# Patient Record
Sex: MALE | Race: WHITE | Employment: FULL TIME | URBAN - METROPOLITAN AREA
[De-identification: names, ages, dates, MRNs, and addresses within clinical notes are randomized per-mention and may not be internally consistent; named-entity substitution may affect disease eponyms.]

---

## 2019-09-01 ENCOUNTER — HOSPITAL ENCOUNTER (EMERGENCY)
Age: 24
Discharge: HOME | End: 2019-09-01
Attending: EMERGENCY MEDICINE
Payer: COMMERCIAL

## 2019-09-01 VITALS
TEMPERATURE: 98.2 F | WEIGHT: 182.98 LBS | SYSTOLIC BLOOD PRESSURE: 139 MMHG | BODY MASS INDEX: 26.2 KG/M2 | HEART RATE: 68 BPM | RESPIRATION RATE: 14 BRPM | HEIGHT: 70 IN | OXYGEN SATURATION: 100 % | DIASTOLIC BLOOD PRESSURE: 93 MMHG

## 2019-09-01 DIAGNOSIS — B02.9 HERPES ZOSTER WITHOUT COMPLICATION: Primary | ICD-10-CM

## 2019-09-01 PROCEDURE — 99282 EMERGENCY DEPT VISIT SF MDM: CPT

## 2019-09-01 RX ORDER — VALGANCICLOVIR 450 MG/1
900 TABLET, FILM COATED ORAL ONCE
Status: DISCONTINUED | OUTPATIENT
Start: 2019-09-01 | End: 2019-09-01 | Stop reason: HOSPADM

## 2019-09-01 RX ORDER — VALACYCLOVIR HYDROCHLORIDE 1 G/1
1000 TABLET, FILM COATED ORAL 3 TIMES DAILY
Qty: 21 TAB | Refills: 0 | Status: SHIPPED | OUTPATIENT
Start: 2019-09-01 | End: 2019-09-08

## 2019-09-01 NOTE — DISCHARGE INSTRUCTIONS
Patient Education        Shingles: Care Instructions  Your Care Instructions    Shingles (herpes zoster) causes pain and a blistered rash. The rash can appear anywhere on the body but will be on only one side of the body, the left or right. It will be in a band, a strip, or a small area. The pain can be very severe. Shingles can also cause tingling or itching in the area of the rash. The blisters scab over after a few days and heal in 2 to 4 weeks. Medicines can help you feel better and may help prevent more serious problems caused by shingles. Shingles is caused by the same virus that causes chickenpox. When you have chickenpox, the virus gets into your nerve roots and stays there (becomes dormant) long after you get over the chickenpox. If the virus becomes active again, it can cause shingles. Follow-up care is a key part of your treatment and safety. Be sure to make and go to all appointments, and call your doctor if you are having problems. It's also a good idea to know your test results and keep a list of the medicines you take. How can you care for yourself at home? · Be safe with medicines. Take your medicines exactly as prescribed. Call your doctor if you think you are having a problem with your medicine. Antiviral medicine helps you get better faster. · Try not to scratch or pick at the blisters. They will crust over and fall off on their own if you leave them alone. · Put cool, wet cloths on the area to relieve pain and itching. You can also use calamine lotion. Try not to use so much lotion that it cakes and is hard to get off. · Put cornstarch or baking soda on the sores to help dry them out so they heal faster. · Do not use thick ointment, such as petroleum jelly, on the sores. This will keep them from drying and healing. · To help remove loose crusts, soak them in tap water. This can help decrease oozing, and dry and soothe the skin.   · Take an over-the-counter pain medicine, such as acetaminophen (Tylenol), ibuprofen (Advil, Motrin), or naproxen (Aleve). Read and follow all instructions on the label. · Avoid close contact with people until the blisters have healed. It is very important for you to avoid contact with anyone who has never had chickenpox or the chickenpox vaccine. Pregnant women, young babies, and anyone else who has a hard time fighting infection (such as someone with HIV, diabetes, or cancer) is especially at risk. When should you call for help? Call your doctor now or seek immediate medical care if:    · You have a new or higher fever.     · You have a severe headache and a stiff neck.     · You lose the ability to think clearly.     · The rash spreads to your forehead, nose, eyes, or eyelids.     · You have eye pain, or your vision gets worse.     · You have new pain in your face, or you cannot move the muscles in your face.     · Blisters spread to new parts of your body.    Watch closely for changes in your health, and be sure to contact your doctor if:    · The rash has not healed after 2 to 4 weeks.     · You still have pain after the rash has healed. Where can you learn more? Go to http://andrea-hernandez.info/. Orville Lyons in the search box to learn more about \"Shingles: Care Instructions. \"  Current as of: July 30, 2018  Content Version: 12.1  © 3281-6326 [employer]. Care instructions adapted under license by [employer] (which disclaims liability or warranty for this information). If you have questions about a medical condition or this instruction, always ask your healthcare professional. [address] any warranty or liability for your use of this information.

## 2019-09-01 NOTE — ED PROVIDER NOTES
40-year-old male presents to the emergency department with rash. Patient reports he has had a intermittent rash to his face over the last 6 or 7 years. He reports that about once a year when he gets stressed or sick he gets a rash to his face. Usually gets on the left side of his forehead. He said he was sick last week with upper respiratory infection and cold. He reports that starting today he developed a small amount of rash on his left forehead. The rash feels like his previous rash that has been treated for on a yearly basis when this pops up. He usually gets treated with Valtrex for 1 week and it gets better. He was told in the past its reactivation of chickenpox virus. Patient denies any rash or pain in his eyes. He denies any rash on his nose or ear. The only place he has noticed it is on the left side of his forehead. No fevers. No shortness of breath or chest pain. No vomiting. No other medical problems. History reviewed. No pertinent past medical history. History reviewed. No pertinent surgical history. History reviewed. No pertinent family history.     Social History     Socioeconomic History    Marital status: SINGLE     Spouse name: Not on file    Number of children: Not on file    Years of education: Not on file    Highest education level: Not on file   Occupational History    Not on file   Social Needs    Financial resource strain: Not on file    Food insecurity:     Worry: Not on file     Inability: Not on file    Transportation needs:     Medical: Not on file     Non-medical: Not on file   Tobacco Use    Smoking status: Never Smoker    Smokeless tobacco: Never Used   Substance and Sexual Activity    Alcohol use: Not on file    Drug use: Not on file    Sexual activity: Not on file   Lifestyle    Physical activity:     Days per week: Not on file     Minutes per session: Not on file    Stress: Not on file   Relationships    Social connections:     Talks on phone: Not on file     Gets together: Not on file     Attends Mosque service: Not on file     Active member of club or organization: Not on file     Attends meetings of clubs or organizations: Not on file     Relationship status: Not on file    Intimate partner violence:     Fear of current or ex partner: Not on file     Emotionally abused: Not on file     Physically abused: Not on file     Forced sexual activity: Not on file   Other Topics Concern    Not on file   Social History Narrative    Not on file         ALLERGIES: Patient has no known allergies. Review of Systems   Constitutional: Negative for activity change and fever. HENT: Negative for congestion. Eyes: Negative for photophobia, pain and redness. Respiratory: Negative for cough and shortness of breath. Cardiovascular: Negative for chest pain and leg swelling. Gastrointestinal: Negative for abdominal pain. Endocrine: Negative for polyuria. Genitourinary: Negative for flank pain and hematuria. Musculoskeletal: Negative for gait problem and neck pain. Skin: Positive for rash. Allergic/Immunologic: Negative for immunocompromised state. Neurological: Negative for speech difficulty and headaches. Hematological: Does not bruise/bleed easily. Psychiatric/Behavioral: Negative for confusion. All other systems reviewed and are negative. Vitals:    09/01/19 0528   BP: (!) 139/93   Pulse: 68   Resp: 14   Temp: 98.2 °F (36.8 °C)   SpO2: 100%   Weight: 83 kg (182 lb 15.7 oz)   Height: 5' 10\" (1.778 m)            Physical Exam   Constitutional: He is oriented to person, place, and time. He appears well-developed and well-nourished. No distress. HENT:   Head: Normocephalic and atraumatic. Right Ear: External ear normal.   Left Ear: External ear normal.   2 small patches of crusty rash to his left forehead. No rash to his nose or ear. Eyes: Pupils are equal, round, and reactive to light.  EOM are normal.   Pupils are 4 mm and reactive bilaterally, normal extraocular movements, no redness of the eyes   Neck: Normal range of motion. Neck supple. No JVD present. No tracheal deviation present. Cardiovascular: Normal rate, regular rhythm and normal heart sounds. Exam reveals no gallop and no friction rub. No murmur heard. Pulmonary/Chest: Effort normal and breath sounds normal. No stridor. No respiratory distress. He has no wheezes. He has no rales. Abdominal: Soft. Bowel sounds are normal. He exhibits no distension. There is no tenderness. There is no rebound and no guarding. Musculoskeletal: Normal range of motion. He exhibits no edema or tenderness. Neurological: He is alert and oriented to person, place, and time. He has normal reflexes. No cranial nerve deficit or sensory deficit. He exhibits normal muscle tone. Coordination normal.   Skin: Skin is warm and dry. He is not diaphoretic. Psychiatric: He has a normal mood and affect. His behavior is normal. Judgment and thought content normal.   Nursing note and vitals reviewed. MDM  Number of Diagnoses or Management Options  Diagnosis management comments: Pt w/ what appears to be shingles in left forehead. No involvement of eye or nose on exam. Will treat w/ Valtrex and PCP f/u and return precautions as needed       Amount and/or Complexity of Data Reviewed  Decide to obtain previous medical records or to obtain history from someone other than the patient: yes  Review and summarize past medical records: yes  Independent visualization of images, tracings, or specimens: yes           Procedures    Good return precautions given to patient. Close follow up with PCP recommended. Patient and/or family voices understanding of this plan. Discharge instructions were explained by me and all concerns were addressed.

## 2020-11-11 ENCOUNTER — HOSPITAL ENCOUNTER (OUTPATIENT)
Dept: GENERAL RADIOLOGY | Age: 25
Discharge: HOME | End: 2020-11-11
Payer: COMMERCIAL

## 2020-11-11 ENCOUNTER — TRANSCRIBE ORDER (OUTPATIENT)
Dept: REGISTRATION | Age: 25
End: 2020-11-11

## 2020-11-11 DIAGNOSIS — M25.561 RIGHT KNEE PAIN: ICD-10-CM

## 2020-11-11 DIAGNOSIS — M25.561 RIGHT KNEE PAIN: Primary | ICD-10-CM

## 2020-11-11 PROCEDURE — 73560 X-RAY EXAM OF KNEE 1 OR 2: CPT

## 2020-11-11 PROCEDURE — 73565 X-RAY EXAM OF KNEES: CPT

## 2021-01-12 ENCOUNTER — APPOINTMENT (OUTPATIENT)
Dept: PHYSICAL THERAPY | Age: 26
End: 2021-01-12

## 2021-01-12 ENCOUNTER — HOSPITAL ENCOUNTER (OUTPATIENT)
Dept: PHYSICAL THERAPY | Age: 26
Discharge: HOME | End: 2021-01-12
Payer: COMMERCIAL

## 2021-01-12 PROCEDURE — 97016 VASOPNEUMATIC DEVICE THERAPY: CPT

## 2021-01-12 PROCEDURE — 97110 THERAPEUTIC EXERCISES: CPT

## 2021-01-12 PROCEDURE — 97161 PT EVAL LOW COMPLEX 20 MIN: CPT
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
## 2021-01-15 ENCOUNTER — HOSPITAL ENCOUNTER (OUTPATIENT)
Dept: PHYSICAL THERAPY | Age: 26
Discharge: HOME | End: 2021-01-15
Payer: COMMERCIAL

## 2021-01-15 PROCEDURE — 97110 THERAPEUTIC EXERCISES: CPT

## 2021-01-15 PROCEDURE — 97112 NEUROMUSCULAR REEDUCATION: CPT

## 2021-01-15 PROCEDURE — 97140 MANUAL THERAPY 1/> REGIONS: CPT

## 2021-01-15 PROCEDURE — 97016 VASOPNEUMATIC DEVICE THERAPY: CPT

## 2021-01-15 NOTE — PROGRESS NOTES
PT DAILY TREATMENT NOTE - [facility] 2-15    Patient Name: Stepan Macdonald  Date:1/15/2021  : 1995  [x]  Patient  Verified  Payor: Roseann Guard / Plan: Dl Mack / Product Type: HMO /    In time:10:31A  Out time:11:30A  Total Treatment Time (min): 59  Total Timed Codes (min): 44  1:1 Treatment Time ([address] only): 44  Visit #:  2    Treatment Area: Knee pain, right [M25.561]    SUBJECTIVE  Pain Level (0-10 scale): 2  Any medication changes, allergies to medications, adverse drug reactions, diagnosis change, or new procedure performed?: [x] No    [] Yes (see summary sheet for update)  Subjective functional status/changes:   [] No changes reported  Pt reports continued decreased sensation down the R lateral lower leg which has not improved. He spoke to MD about issue at appt, who recommended avoiding elevation of leg for better blood flow and to monitor symptoms. Pt reports compliance with HEP,  no difficulty with use of crutches for ambulation or stair negotiation. OBJECTIVE    Modality rationale: decrease edema, decrease inflammation and decrease pain to improve the patients ability to ambulate with decreased pain and more normalized mechanics. Min Type Additional Details     []? Estim: []? Att   []? Unatt        []? TENS instruct                  []?IFC  []? Premod   [x]? NMES                     []?Other:  []?w/US   []?w/ice   []?w/heat  Position:    Location:      []? Traction: []? Cervical       []? Lumbar                       []? Prone          []? Supine                       []?Intermittent   []? Continuous Lbs:  []? before manual  []? after manual  []?w/heat     []? Ultrasound: []? Continuous   []? Pulsed at:                           []? 1MHz   []? 3MHz Location:  W/cm2:     []? Paraffin         Location:   []?w/heat     []? Ice     []? Heat  []? Ice massage Position:  Location:     []? Laser  []? Other: Position:  Location:   15    [x]?   Vasopneumatic Device Pressure:       [x]? lo []? med []? hi Temperature: 34      [x]? Skin assessment post-treatment:  [x]? intact []? redness- no adverse reaction    []? redness  adverse reaction:      22 min Therapeutic Exercise:  [x]? See flow sheet : R knee flexion/extension PROM   Rationale: increase ROM, increase strength and increase proprioception to improve the patients ability to ambulate with decreased pain and improved mechanics. 10 min Neuromuscular Re-education:  []  See flow sheet : longsitting Russian NMES with quad set, Duty Cycle 10/50   Rationale: increase ROM and increase strength  to improve the patients ability to ambulate with decreased pain and improved mechanics. 12 min Manual Therapy: medial/lateral/superior/inferior patellar mobs, grade 1 posterior femoral mobs, cross friction massage on incision    Rationale: decrease pain, increase ROM, increase tissue extensibility and decrease edema  to improve the patients ability to ambulate with decreased discomfort and improve mechanics. With   [] TE   [] TA   [] Neuro   [] SC   [] other: Patient Education: [x] Review HEP    [] Progressed/Changed HEP based on:   [] positioning   [] body mechanics   [] transfers   [] heat/ice application    [] other:      Other Objective/Functional Measures:   Knee AROM: 1/72     Pain Level (0-10 scale) post treatment: 2    ASSESSMENT/Changes in Function:   Pt reports MD removed steri-strips, incisions intact. Pt knee ROM improving after manual interventions. Pt reporting decreased discomfort with knee PROM. Pt advancing with TE with 4 way SLR, demonstrating good form, though continued EXT lag with flexion. Pt progressed well through TE with limited pain. Pt tolerated added NMES with quad sets, achieving good co-contraction with e-stim.    Patient will continue to benefit from skilled PT services to modify and progress therapeutic interventions, address functional mobility deficits, address ROM deficits, address strength deficits, analyze and address soft tissue restrictions, analyze and cue movement patterns, analyze and modify body mechanics/ergonomics, assess and modify postural abnormalities, address imbalance/dizziness and instruct in home and community integration to attain remaining goals. []  See Plan of Care  []  See progress note/recertification  []  See Discharge Summary         Progress towards goals / Updated goals:  Short Term Goals: To be accomplished in 6 weeks:               1. Pt will be independent and compliant with HEP. 2) Pt will demonstrate Knee extension >/= to 0 degrees ext to aid in ambulation   3) Pt will be able to Ambulate with more normalized gait pattern on level terrain   4) Pt will demonstrate Knee flexion >/= to 120 to aid in sitting   Long Term Goals: To be accomplished in 12 week:               1. Pt will be independent and compliant with HEP. 2. Pt will improve FOTO score by the MCID demonstrating improved overall function with decreased pain or discomfort. 3. Pt will be able toNavigate a flight of stairs without pain   4) Pt will be able to Ambulate on uneven terrain without pain or instability   5) Pt will be able to Ambulate >/= 1 mile without pain   6) Pt will demonstrate Knee flexion >/= 120 degrees to aid in sitting/squatting  7) Pt will be able to return to running >/=1 mile without pain. 8) Pt will be able to squat to 90 degrees knee flexion with BW resistance without complaints of pain.     PLAN  [x]  Upgrade activities as tolerated     [x]  Continue plan of care  []  Update interventions per flow sheet       []  Discharge due to:_  []  Other:_      Jasson Souza, PT, DPT 1/15/2021

## 2021-01-19 ENCOUNTER — HOSPITAL ENCOUNTER (OUTPATIENT)
Dept: PHYSICAL THERAPY | Age: 26
Discharge: HOME | End: 2021-01-19
Payer: COMMERCIAL

## 2021-01-19 PROCEDURE — 97112 NEUROMUSCULAR REEDUCATION: CPT

## 2021-01-19 PROCEDURE — 97016 VASOPNEUMATIC DEVICE THERAPY: CPT

## 2021-01-19 PROCEDURE — 97140 MANUAL THERAPY 1/> REGIONS: CPT

## 2021-01-19 PROCEDURE — 97110 THERAPEUTIC EXERCISES: CPT

## 2021-01-19 NOTE — PROGRESS NOTES
PT DAILY TREATMENT NOTE - [facility] 2-15    Patient Name: Hiren Barbosa  Date:2021  : 1995  [x]  Patient  Verified  Payor: Tomasa Gregory / Plan: Do Fenton / Product Type: HMO /    In time:10:03a  Out time:11:22a  Total Treatment Time (min): 79 min   Total Timed Codes (min): 64 min  1:1 Treatment Time ( only): 58 min  Visit #:  3    Treatment Area: Knee pain, right [M25.561]    SUBJECTIVE  Pain Level (0-10 scale): 1  Any medication changes, allergies to medications, adverse drug reactions, diagnosis change, or new procedure performed?: [x]? No    []? Yes (see summary sheet for update)  Subjective functional status/changes:   []? No changes reported  Pt reports he spoke to MD about concerns with tingling and cold in his right foot and lateral lower leg. Pt and PT communicated with MD to monitor any changes in these symptoms, no overt concerns at this moment. Pt reports compliance with HEP and improved ROM and decreased edema and bruising.      OBJECTIVE            Modality rationale: decrease edema, decrease inflammation and decrease pain to improve the patients ability to ambulate with decreased pain and more normalized mechanics.     Min Type Additional Details     []? ? Estim: []??Att   []? ? Unatt        []? ?TENS instruct                  []? ?IFC  []? ?Premod   [x]? ? NMES                     []? ? Other:  []??w/US   []? ?w/ice   []? ?w/heat  Position:    Location:      []? ?  Traction: []?? Cervical       []? ?Lumbar                       []? ? Prone          []? ?Supine                       []? ?Intermittent   []? ? Continuous Lbs:  []?? before manual  []? ? after manual  []? ?w/heat     []? ?  Ultrasound: []??Continuous   []? ? Pulsed at:                           []? ?1MHz   []? ? 3MHz Location:  W/cm2:     []? ? Paraffin         Location:   []??w/heat     []? ?  Ice     []? ?  Heat  []? ?  Ice massage Position:  Location:     []? ?  Laser  []? ?  Other: Position:  Location:   15    [x]? ?  Vasopneumatic Device Pressure:       [x]? ? lo []? ? med []?? hi   Temperature: 34      [x]? ? Skin assessment post-treatment:  [x]? ? intact []? ?redness- no adverse reaction    []? ?redness  adverse reaction:      32 min Therapeutic Exercise:  [x]? ? See flow sheet : R knee flexion/extension PROM   Rationale: increase ROM, increase strength and increase proprioception to improve the patients ability to ambulate with decreased pain and improved mechanics. 16 min Neuromuscular Re-education:  []? See flow sheet : longsitting Russian NMES with quad set, Duty Cycle 10/50; HS isometrics and SLR with cuing for activation   Rationale: increase ROM and increase strength  to improve the patients ability to ambulate with decreased pain and improved mechanics.    16 min Manual Therapy: medial/lateral/superior/inferior patellar mobs, grade 1 posterior femoral mobs, cross friction massage on incision, STM to distal biceps femoris and proximal lateral gastroc    Rationale: decrease pain, increase ROM, increase tissue extensibility and decrease edema  to improve the patients ability to ambulate with decreased discomfort and improve mechanics. With   []? TE   []? TA   []? Neuro   []? SC   []? other: Patient Education: [x]? Review HEP    []? Progressed/Changed HEP based on:   []? positioning   []? body mechanics   []? transfers   []? heat/ice application    []? other:       Other Objective/Functional Measures:            Pain Level (0-10 scale) post treatment: 1     ASSESSMENT/Changes in Function:   Pt noted decreased light touch sensation along R anterior lower leg and coldness throughout right foot (distal>proximal); Pt also presenting with continued tingling in plantar aspect of foot and big toe; discussed findings and Pt complaints with nurse at [address] [place] [address] office who will relay to MD. Pt stated increased sensation/blood flow with WB/added heel raises.  Pt R knee ROM and strength continues to improve after manual interventions and therapeutic exercise. Pt achieving increased quad activation during quad sets and SLR. Pt performed SLR without brace with minimal extension lag. Added HS isometrics and heel raises (with brace) with no increased discomfort. Patient will continue to benefit from skilled PT services to modify and progress therapeutic interventions, address functional mobility deficits, address ROM deficits, address strength deficits, analyze and address soft tissue restrictions, analyze and cue movement patterns, analyze and modify body mechanics/ergonomics, assess and modify postural abnormalities, address imbalance/dizziness and instruct in home and community integration to attain remaining goals. []? See Plan of Care  []? See progress note/recertification  []? See Discharge Summary         Progress towards goals / Updated goals:  Short Term Goals: To be accomplished in 6 weeks:               1. Pt will be independent and compliant with HEP. 2) Pt will demonstrate Knee extension >/= to 0 degrees ext to aid in ambulation   3) Pt will be able to Ambulate with more normalized gait pattern on level terrain   4) Pt will demonstrate Knee flexion >/= to 120 to aid in sitting   Long Term Goals: To be accomplished in 12 week:               1. Pt will be independent and compliant with HEP.              2. Pt will improve FOTO score by the MCID demonstrating improved overall function with decreased pain or discomfort.                3. Pt will be able toNavigate a flight of stairs without pain   4) Pt will be able to Ambulate on uneven terrain without pain or instability   5) Pt will be able to Ambulate >/= 1 mile without pain   6) Pt will demonstrate Knee flexion >/= 120 degrees to aid in sitting/squatting  7) Pt will be able to return to running >/=1 mile without pain. 8) Pt will be able to squat to 90 degrees knee flexion with BW resistance without complaints of pain.     PLAN  [x]?   Upgrade activities as tolerated     [x]? Continue plan of care  []? Update interventions per flow sheet       []? Discharge due to:_  []?   Other:_        Klarissa Diamond, PT, DPT 1/19/2021

## 2021-01-20 ENCOUNTER — APPOINTMENT (OUTPATIENT)
Dept: PHYSICAL THERAPY | Age: 26
End: 2021-01-20
Payer: COMMERCIAL

## 2021-01-22 ENCOUNTER — HOSPITAL ENCOUNTER (OUTPATIENT)
Dept: PHYSICAL THERAPY | Age: 26
Discharge: HOME | End: 2021-01-22
Payer: COMMERCIAL

## 2021-01-22 PROCEDURE — 97140 MANUAL THERAPY 1/> REGIONS: CPT

## 2021-01-22 PROCEDURE — 97016 VASOPNEUMATIC DEVICE THERAPY: CPT

## 2021-01-22 PROCEDURE — 97112 NEUROMUSCULAR REEDUCATION: CPT

## 2021-01-22 PROCEDURE — 97110 THERAPEUTIC EXERCISES: CPT

## 2021-01-22 NOTE — PROGRESS NOTES
PT DAILY TREATMENT NOTE - [facility] 2-15    Patient Name: Suzie Parish  Date:2021  : 1995  [x]  Patient  Verified  Payor: Mary Clay / Plan: Tete Newsome / Product Type: HMO /    In time: 9:53a  Out time:11:10a  Total Treatment Time (min): 77  Total Timed Codes (min): 62  1:1 Treatment Time ( only): 62  Visit #:  4    Treatment Area: Knee pain, right [M25.561]    SUBJECTIVE  Pain Level (0-10 scale): 2  Any medication changes, allergies to medications, adverse drug reactions, diagnosis change, or new procedure performed?: [x]? ? No    []? ? Yes (see summary sheet for update)  Subjective functional status/changes:   []? ? No changes reported  Pt reports he had an LANCE and a doppler test done with negative findings. MD prescribed medication to assist with numbness/tingling, which patient has not taken yet. Pt reports continued numbness/tingling down into RLE but is sleeping a lot better at night. Pt reports R knee was sore after last session, but has since resolved.      OBJECTIVE                 Modality rationale: decrease edema, decrease inflammation and decrease pain to improve the patients ability to ambulate with decreased pain and more normalized mechanics.     Min Type Additional Details     []??? Estim: []? ? ? Att   []? ??Unatt        []? ??TENS instruct                  []? ??IFC  []? ? ?Premod   [x]? ??NMES                     []? ??Other:  []???w/US   []? ??w/ice   []? ??w/heat  Position:    Location:      []? ??  Traction: []??? Cervical       []? ? ?Lumbar                       []? ?? Prone          []? ? ?Supine                       []? ? ? Intermittent   []? ?? Continuous Lbs:  []??? before manual  []? ?? after manual  []? ??w/heat     []? ??  Ultrasound: []? ? ? Continuous   []? ?? Pulsed at:                           []? ??1MHz   []? ??3MHz Location:  W/cm2:     []??? Paraffin         Location:   []???w/heat     []? ??  Ice     []? ??  Heat  []? ??  Ice massage Position:  Location:     []? ??  Laser  []? ??  Other: Position:  Location:   15    [x]? ??  Vasopneumatic Device Pressure:       [x]? ?? lo []? ?? med []? ?? hi   Temperature: 34      [x]? ?? Skin assessment post-treatment:  [x]? ??intact []? ??redness- no adverse reaction    []? ??redness - adverse reaction:      28 min Therapeutic Exercise:  [x]? ?? See flow sheet : R knee flexion/extension PROM   Rationale: increase ROM, increase strength and increase proprioception to improve the patients ability to ambulate with decreased pain and improved mechanics.    18 min Neuromuscular Re-education:  [x]? ?  See flow sheet : supine Russian NMES with SLR, Duty Cycle 10/30; M/L A/P weight shifts     Rationale: increase ROM and increase strength  to improve the patients ability to ambulate with decreased pain and improved mechanics.    16 min Manual Therapy: medial/lateral/superior/inferior patellar mobs, grade II-III posterior femoral mobs, cross friction massage on incision, STM to proximal lateral gastroc    Rationale: decrease pain, increase ROM, increase tissue extensibility and decrease edema  to improve the patients ability to ambulate with decreased discomfort and improve mechanics.                                                                With   []?? TE   []?? TA   []? ? Neuro   []?? SC   []?? other: Patient Education: [x]? ? Review HEP    []? ? Progressed/Changed HEP based on:   []?? positioning   []? ? body mechanics   []? ? transfers   []? ? heat/ice application    []? ? other:       Other Objective/Functional Measures:         Knee ROM: 4/0/110     Pain Level (0-10 scale) post treatment: 1     ASSESSMENT/Changes in Function:   R knee ROM continues to improve, benefiting from manual interventions and ther-ex. Progressed quad activation with NMES with SLR, pt demonstrating strong contraction throughout range. Added weight bearing M/L and A/P weight shifts with UE support. Pt achieving good quad contraction.  Pt more challenged with A/P weight shifts with mild p!, cued to keep L foot on ground for increased support which relieved pain. Patient will continue to benefit from skilled PT services to modify and progress therapeutic interventions, address functional mobility deficits, address ROM deficits, address strength deficits, analyze and address soft tissue restrictions, analyze and cue movement patterns, analyze and modify body mechanics/ergonomics, assess and modify postural abnormalities, address imbalance/dizziness and instruct in home and community integration to attain remaining goals.   Treatment was performed with direct supervision by Pasco Lennox, PT, DPT.     []??  See Plan of Care  []? ?  See progress note/recertification  []? ?  See Discharge Summary         Progress towards goals / Updated goals:  Short Term Goals: To be accomplished in 6 weeks:               1. Pt will be independent and compliant with HEP. 2) Pt will demonstrate Knee extension >/= to 0 degrees ext to aid in ambulation   3) Pt will be able to Ambulate with more normalized gait pattern on level terrain   4) Pt will demonstrate Knee flexion >/= to 120 to aid in sitting   Long Term Goals: To be accomplished in 12 week:               1. Pt will be independent and compliant with HEP.              2. Pt will improve FOTO score by the MCID demonstrating improved overall function with decreased pain or discomfort.                3. Pt will be able toNavigate a flight of stairs without pain   4) Pt will be able to Ambulate on uneven terrain without pain or instability   5) Pt will be able to Ambulate >/= 1 mile without pain   6) Pt will demonstrate Knee flexion >/= 120 degrees to aid in sitting/squatting  7) Pt will be able to return to running >/=1 mile without pain. 8) Pt will be able to squat to 90 degrees knee flexion with BW resistance without complaints of pain.     PLAN   [x]? ?  Upgrade activities as tolerated     [x]? ?  Continue plan of care  []? ?  Update interventions per flow sheet       []? ?  Discharge due to:_  []??  Other:_           Deonte Kim, SPT 1/22/2021

## 2021-01-25 ENCOUNTER — HOSPITAL ENCOUNTER (OUTPATIENT)
Dept: PHYSICAL THERAPY | Age: 26
End: 2021-01-25
Payer: COMMERCIAL

## 2021-01-27 ENCOUNTER — HOSPITAL ENCOUNTER (OUTPATIENT)
Dept: PHYSICAL THERAPY | Age: 26
Discharge: HOME | End: 2021-01-27
Payer: COMMERCIAL

## 2021-01-27 PROCEDURE — 97110 THERAPEUTIC EXERCISES: CPT

## 2021-01-27 PROCEDURE — 97016 VASOPNEUMATIC DEVICE THERAPY: CPT

## 2021-01-27 PROCEDURE — 97112 NEUROMUSCULAR REEDUCATION: CPT

## 2021-01-27 PROCEDURE — 97140 MANUAL THERAPY 1/> REGIONS: CPT

## 2021-01-27 NOTE — PROGRESS NOTES
PT DAILY TREATMENT NOTE - [facility] 2-15    Patient Name: Marga Gross  Date:2021  : 1995  [x]  Patient  Verified  Payor: Jonn Diaz / Plan: Ye Cea / Product Type: HMO /    In time:9:57A  Out time:11:07A  Total Treatment Time (min): 70  Total Timed Codes (min): 55  1:1 Treatment Time ([address] only): 48   Visit #:  5    Treatment Area: Knee pain, right [M25.561]    SUBJECTIVE  Pain Level (0-10 scale): 0/10  Any medication changes, allergies to medications, adverse drug reactions, diagnosis change, or new procedure performed?: [x]? ?? No    []??? Yes (see summary sheet for update)  Subjective functional status/changes:   []? ?? No changes reported  Pt reported doing well this morning.      OBJECTIVE                 Modality rationale: decrease edema, decrease inflammation and decrease pain to improve the patients ability to ambulate with decreased pain and more normalized mechanics.     Min Type Additional Details     []???? Estim: []?? ?? Att   []????Unatt        []????TENS instruct                  []????IFC  []????Premod   [x]? ? ??NMES                     []?? ??Other:  []????w/US   []? ???w/ice   []? ???w/heat  Position:    Location:      []????  Traction: []???? Cervical       []????Lumbar                       []???? Prone          []????Supine                       []?? ?? Intermittent   []???? Continuous Lbs:  []???? before manual  []???? after manual  []? ???w/heat     []????  Ultrasound: []???? Continuous   []???? Pulsed at:                           []????1MHz   []????3MHz Location:  W/cm2:     []???? Paraffin         Location:   []????w/heat     []????  Ice     []????  Heat  []????  Ice massage Position:  Location:     []????  Laser  []????  Other: Position:  Location:   15    [x]? ???  Vasopneumatic Device Pressure:       [x]? ??? lo []???? med []???? hi   Temperature: 34      [x]? ??? Skin assessment post-treatment:  [x]? ???intact []? ???redness- no adverse reaction    []? ???redness  adverse reaction:    30 min Therapeutic Exercise:  [x]? ??? See flow sheet : R knee flexion/extension PROM   Rationale: increase ROM, increase strength and increase proprioception to improve the patients ability to ambulate with decreased pain and improved mechanics.    10 min Neuromuscular Re-education:  [x]? ??  See flow sheet : supine Russian NMES with SLR, Duty Cycle 10/30; M/L A/P weight shifts     Rationale: increase ROM and increase strength  to improve the patients ability to ambulate with decreased pain and improved mechanics.    15 min Manual Therapy: medial/lateral/superior/inferior patellar mobs, grade II-III posterior femoral mobs, cross friction massage on incision, STM to proximal lateral gastroc    Rationale: decrease pain, increase ROM, increase tissue extensibility and decrease edema  to improve the patients ability to ambulate with decreased discomfort and improve mechanics.                                                                With   []??? TE   []??? TA   []??? Neuro   []??? SC   []? ?? other: Patient Education: [x]??? Review HEP    []? ?? Progressed/Changed HEP based on:   []??? positioning   []? ?? body mechanics   []? ?? transfers   []? ?? heat/ice application    []? ?? other:       Other Objective/Functional Measures:         Knee ROM: flexion:118             Extension: 0     Pain Level (0-10 scale) post treatment: 0     ASSESSMENT/Changes in Function:   Pt continues to be challenged with A/P weight shifting. Able to tolerate standing hip abduction and extension. Fatigues quickly.    Patient will continue to benefit from skilled PT services to modify and progress therapeutic interventions, address functional mobility deficits, address ROM deficits, address strength deficits, analyze and address soft tissue restrictions, analyze and cue movement patterns, analyze and modify body mechanics/ergonomics, assess and modify postural abnormalities, address imbalance/dizziness and instruct in home and community integration to attain remaining goals.     []? ??  See Plan of Care  []? ??  See progress note/recertification  []? ??  See Discharge Summary         Progress towards goals / Updated goals:  Short Term Goals: To be accomplished in 6 weeks:               1. Pt will be independent and compliant with HEP. 2) Pt will demonstrate Knee extension >/= to 0 degrees ext to aid in ambulation   3) Pt will be able to Ambulate with more normalized gait pattern on level terrain   4) Pt will demonstrate Knee flexion >/= to 120 to aid in sitting   Long Term Goals: To be accomplished in 12 week:               1. Pt will be independent and compliant with HEP.              2. Pt will improve FOTO score by the MCID demonstrating improved overall function with decreased pain or discomfort.                3. Pt will be able toNavigate a flight of stairs without pain   4) Pt will be able to Ambulate on uneven terrain without pain or instability   5) Pt will be able to Ambulate >/= 1 mile without pain   6) Pt will demonstrate Knee flexion >/= 120 degrees to aid in sitting/squatting  7) Pt will be able to return to running >/=1 mile without pain. 8) Pt will be able to squat to 90 degrees knee flexion with BW resistance without complaints of pain.     PLAN   [x]???  Upgrade activities as tolerated     [x]? ??  Continue plan of care  []? ??  Update interventions per flow sheet       []???  Discharge due to:_  []???  Other:_           Brian Coker, JACEK, OPTA 1/27/2021

## 2021-01-29 ENCOUNTER — HOSPITAL ENCOUNTER (OUTPATIENT)
Dept: PHYSICAL THERAPY | Age: 26
Discharge: HOME | End: 2021-01-29
Payer: COMMERCIAL

## 2021-01-29 PROCEDURE — 97110 THERAPEUTIC EXERCISES: CPT

## 2021-01-29 PROCEDURE — 97016 VASOPNEUMATIC DEVICE THERAPY: CPT

## 2021-01-29 PROCEDURE — 97140 MANUAL THERAPY 1/> REGIONS: CPT

## 2021-01-29 NOTE — PROGRESS NOTES
PT DAILY TREATMENT NOTE - [facility] 2-15    Patient Name: Rico Medrano  Date:2021  : 1995  [x]  Patient  Verified  Payor: Cherri Fees / Plan: Balbir Brunson / Product Type: HMO /    In time:10:15am  Out time:11:28  Total Treatment Time (min): 73  Total Timed Codes (min): 58  1:1 Treatment Time ( only): 52  Visit #:  6    Treatment Area: Knee pain, right [M25.561]    SUBJECTIVE  Pain Level (0-10 scale): 0/10  Any medication changes, allergies to medications, adverse drug reactions, diagnosis change, or new procedure performed?: [x]???? No    []???? Yes (see summary sheet for update)  Subjective functional status/changes:   []???? No changes reported  Pt reports knee has been doing well since last session. Pt reports having done his core exercise routine which irritated his knee, but has since resolved.      OBJECTIVE                 Modality rationale: decrease edema, decrease inflammation and decrease pain to improve the patients ability to ambulate with decreased pain and more normalized mechanics.     Min Type Additional Details     []????? Estim: []??? ? ? Att   []??? ?? Unatt        []?????TENS instruct                  []?????IFC  []??? ? ? Premod   [x]??? ? ?NMES                     []??? ?? Other:  []?????w/US   []?????w/ice   []?????w/heat  Position:    Location:      []?????  Traction: []????? Cervical       []??? ? ?Lumbar                       []????? Prone          []??? ? ? Supine                       []??? ? ? Intermittent   []??? ? ? Continuous Lbs:  []????? before manual  []????? after manual  []?????w/heat     []?????  Ultrasound: []??? ? ? Continuous   []????? Pulsed at:                           []?????1MHz   []?????3MHz Location:  W/cm2:     []????? Paraffin         Location:   []?????w/heat     []?????  Ice     []?????  Heat  []?????  Ice massage Position:  Location:     []?????  Laser  []?????  Other: Position:  Location:   15    [x]?????  Vasopneumatic Device Pressure:       [x]????? lo [x]????? med []????? hi   Temperature: 34      [x]????? Skin assessment post-treatment:  [x]? ????intact []?????redness- no adverse reaction    []?????redness - adverse reaction:      42 min Therapeutic Exercise:  [x]? ???? See flow sheet : R knee flexion/extension PROM   Rationale: increase ROM, increase strength and increase proprioception to improve the patients ability to ambulate with decreased pain and improved mechanics.    6 min Neuromuscular Re-education:  [x]? ???  See flow sheet : M/L A/P weight shifts     Rationale: increase ROM and increase strength  to improve the patients ability to ambulate with decreased pain and improved mechanics.    10 min Manual Therapy: 4-way patellar mobs, grade III posterior femoral mobs, cross friction massage on incision    Rationale: decrease pain, increase ROM, increase tissue extensibility and decrease edema  to improve the patients ability to ambulate with decreased discomfort and improve mechanics.                                                                With   []???? TE   []???? TA   []???? Neuro   []???? SC   []???? other: Patient Education: [x]???? Review HEP    []???? Progressed/Changed HEP based on:   []???? positioning   []???? body mechanics   []???? transfers   [x]???? heat/ice application    []???? other:       Other Objective/Functional Measures:         Knee ROM: flexion:124                        Extension: +4     Pain Level (0-10 scale) post treatment: 0     ASSESSMENT/Changes in Function:   Pt tolerated advancements in therapeutic exercises today. Progressed WB exercises by adding mini squats and forward and lateral step ups with 4in step. Pt with increased fatigue with step ups, but demonstrated good eccentric quad control. Discussed with patients about ambulating without crutches, but to take them on his trip to [place] next week.    Patient will continue to benefit from skilled PT services to modify and progress therapeutic interventions, address functional mobility deficits, address ROM deficits, address strength deficits, analyze and address soft tissue restrictions, analyze and cue movement patterns, analyze and modify body mechanics/ergonomics, assess and modify postural abnormalities, address imbalance/dizziness and instruct in home and community integration to attain remaining goals.   Treatment was performed with direct supervision by Alessia Mojica, PT, DPT.     []????  See Plan of Care  []????  See progress note/recertification  []????  See Discharge Summary         Progress towards goals / Updated goals:  Short Term Goals: To be accomplished in 6 weeks:               1. Pt will be independent and compliant with HEP. 2) Pt will demonstrate Knee extension >/= to 0 degrees ext to aid in ambulation   3) Pt will be able to Ambulate with more normalized gait pattern on level terrain   4) Pt will demonstrate Knee flexion >/= to 120 to aid in sitting   Long Term Goals: To be accomplished in 12 week:               1. Pt will be independent and compliant with HEP.              2. Pt will improve FOTO score by the MCID demonstrating improved overall function with decreased pain or discomfort.                3. Pt will be able toNavigate a flight of stairs without pain   4) Pt will be able to Ambulate on uneven terrain without pain or instability   5) Pt will be able to Ambulate >/= 1 mile without pain   6) Pt will demonstrate Knee flexion >/= 120 degrees to aid in sitting/squatting  7) Pt will be able to return to running >/=1 mile without pain. 8) Pt will be able to squat to 90 degrees knee flexion with BW resistance without complaints of pain.     PLAN   [x]? ???  Upgrade activities as tolerated     [x]? ???  Continue plan of care  []????  Update interventions per flow sheet       []????  Discharge due to:_  []????  Other:_        Triny Camp 1/29/2021

## 2021-02-01 ENCOUNTER — HOSPITAL ENCOUNTER (OUTPATIENT)
Dept: PHYSICAL THERAPY | Age: 26
Discharge: HOME | End: 2021-02-01
Payer: COMMERCIAL

## 2021-02-01 PROCEDURE — 97016 VASOPNEUMATIC DEVICE THERAPY: CPT

## 2021-02-01 PROCEDURE — 97140 MANUAL THERAPY 1/> REGIONS: CPT

## 2021-02-01 PROCEDURE — 97110 THERAPEUTIC EXERCISES: CPT

## 2021-02-01 NOTE — PROGRESS NOTES
PT DAILY TREATMENT NOTE - [facility] 2-15    Patient Name: Chandu Vieira  Date:2021  : 1995  [x]  Patient  Verified  Payor: Philly Mora / Plan: Jayshree Araiza / Product Type: HMO /    In time:9:13A  Out time:10:29A  Total Treatment Time (min): 76  Total Timed Codes (min): 61  1:1 Treatment Time ( only): 48   Visit #:  7    Treatment Area: Knee pain, right [M25.561]    SUBJECTIVE  Pain Level (0-10 scale): 0/10  Any medication changes, allergies to medications, adverse drug reactions, diagnosis change, or new procedure performed?: [x]????? No    []????? Yes (see summary sheet for update)  Subjective functional status/changes:   []????? No changes reported  Pt reported doing well this morning. OBJECTIVE                 Modality rationale: decrease edema, decrease inflammation and decrease pain to improve the patients ability to ambulate with decreased pain and more normalized mechanics.     Min Type Additional Details     []?????? Estim: []???? ? ? Att   []???? ?? Unatt        []??????TENS instruct                  []??????IFC  []?????? Premod   [x]??????NMES                     []?????? Other:  []??????w/US   []??????w/ice   []??????w/heat  Position:    Location:      []??????  Traction: []?????? Cervical       []??????Lumbar                       []?????? Prone          []?????? Supine                       []???? ? ? Intermittent   []?????? Continuous Lbs:  []?????? before manual  []?????? after manual  []??????w/heat     []??????  Ultrasound: []?????? Continuous   []?????? Pulsed at:                           []??????1MHz   []??????3MHz Location:  W/cm2:     []?????? Paraffin         Location:   []??????w/heat     []??????  Ice     []??????  Heat  []??????  Ice massage Position:  Location:     []??????  Laser  []??????  Other: Position:  Location:   15    [x]??????  Vasopneumatic Device Pressure:       [x]?????? lo [x]?????? med []?????? hi   Temperature: 34      [x]?????? Skin assessment post-treatment:  [x]??????intact []??????redness- no adverse reaction    []??????redness  adverse reaction:      46 min Therapeutic Exercise:  [x]?????? See flow sheet : R knee flexion/extension PROM   Rationale: increase ROM, increase strength and increase proprioception to improve the patients ability to ambulate with decreased pain and improved mechanics.    15 min Manual Therapy: 4-way patellar mobs, grade III posterior femoral mobs, cross friction massage on incision    Rationale: decrease pain, increase ROM, increase tissue extensibility and decrease edema  to improve the patients ability to ambulate with decreased discomfort and improve mechanics.                                                                With   []????? TE   []????? TA   []????? Neuro   []????? SC   []????? other: Patient Education: [x]????? Review HEP    []????? Progressed/Changed HEP based on:   []????? positioning   []????? body mechanics   []????? transfers   [x]????? heat/ice application    []????? other:       Other Objective/Functional Measures:         Knee ROM: Flexion:126                       Extension: +5     Pain Level (0-10 scale) post treatment: 0/10     ASSESSMENT/Changes in Function:   Pt tolerated session well. Fatigues easily with CKC exercises. Pt inquired about holding PVC pipe on shoulder for squats. Advised to hold off at this time.    Patient will continue to benefit from skilled PT services to modify and progress therapeutic interventions, address functional mobility deficits, address ROM deficits, address strength deficits, analyze and address soft tissue restrictions, analyze and cue movement patterns, analyze and modify body mechanics/ergonomics, assess and modify postural abnormalities, address imbalance/dizziness and instruct in home and community integration to attain remaining goals.     []?????  See Plan of Care  []?????  See progress note/recertification  []?????  See Discharge Summary         Progress towards goals / Updated goals:  Short Term Goals: To be accomplished in 6 weeks:               1. Pt will be independent and compliant with HEP. 2) Pt will demonstrate Knee extension >/= to 0 degrees ext to aid in ambulation   3) Pt will be able to Ambulate with more normalized gait pattern on level terrain   4) Pt will demonstrate Knee flexion >/= to 120 to aid in sitting   Long Term Goals: To be accomplished in 12 week:               1. Pt will be independent and compliant with HEP.              2. Pt will improve FOTO score by the MCID demonstrating improved overall function with decreased pain or discomfort.                3. Pt will be able toNavigate a flight of stairs without pain   4) Pt will be able to Ambulate on uneven terrain without pain or instability   5) Pt will be able to Ambulate >/= 1 mile without pain   6) Pt will demonstrate Knee flexion >/= 120 degrees to aid in sitting/squatting  7) Pt will be able to return to running >/=1 mile without pain.   8) Pt will be able to squat to 90 degrees knee flexion with BW resistance without complaints of pain.     PLAN   [x]?????  Upgrade activities as tolerated     [x]?????  Continue plan of care  []?????  Update interventions per flow sheet       []?????  Discharge due to:_  []?????  Other:_        Ignacio Barker PTA, [facility] 2/1/2021

## 2021-02-03 ENCOUNTER — HOSPITAL ENCOUNTER (OUTPATIENT)
Dept: PHYSICAL THERAPY | Age: 26
Discharge: HOME | End: 2021-02-03
Payer: COMMERCIAL

## 2021-02-03 PROCEDURE — 97112 NEUROMUSCULAR REEDUCATION: CPT

## 2021-02-03 PROCEDURE — 97110 THERAPEUTIC EXERCISES: CPT

## 2021-02-03 PROCEDURE — 97016 VASOPNEUMATIC DEVICE THERAPY: CPT

## 2021-02-03 NOTE — PROGRESS NOTES
PT DAILY TREATMENT NOTE - [facility] 2-15    Patient Name: Rico Medrano  Date:2/3/2021  : 1995  [x]  Patient  Verified  Payor: Cherri Fees / Plan: Balbir Brunson / Product Type: HMO /    In time:10:57am  Out time:12:15pm  Total Treatment Time (min): 78  Total Timed Codes (min): 63  1:1 Treatment Time ( only): 62  Visit #:  8    Treatment Area: Knee pain, right [M25.561]    SUBJECTIVE  Pain Level (0-10 scale): 0/10  Any medication changes, allergies to medications, adverse drug reactions, diagnosis change, or new procedure performed?: [x]?????? No    []?????? Yes (see summary sheet for update)  Subjective functional status/changes:   []?????? No changes reported  Pt reported knee feels good, reports compliance with HEP.     OBJECTIVE                 Modality rationale: decrease edema, decrease inflammation and decrease pain to improve the patients ability to ambulate with decreased pain and more normalized mechanics.     Min Type Additional Details     []??????? Estim: []????? ?? Att   []??????? Unatt        []???????TENS instruct                  []???????IFC  []??????? Premod   [x]???????NMES                     []??????? Other:  []???????w/US   []???????w/ice   []???????w/heat  Position:    Location:      []???????  Traction: []??????? Cervical       []???????Lumbar                       []??????? Prone          []??????? Supine                       []????? ? ? Intermittent   []??????? Continuous Lbs:  []??????? before manual  []??????? after manual  []???????w/heat     []???????  Ultrasound: []??????? Continuous   []??????? Pulsed at:                           []???????1MHz   []???????3MHz Location:  W/cm2:     []??????? Paraffin         Location:   []???????w/heat     []???????  Ice     []???????  Heat  []???????  Ice massage Position:  Location:     []???????  Laser  []???????  Other: Position:  Location:   15    [x]???????  Vasopneumatic Device Pressure:       [x]??????? lo [x]??????? med []??????? hi Temperature: 34      [x]??????? Skin assessment post-treatment:  [x]???????intact []???????redness- no adverse reaction    []???????redness  adverse reaction:      53 min Therapeutic Exercise:  [x]??????? See flow sheet : R knee flexion/extension PROM   Rationale: increase ROM, increase strength and increase proprioception to improve the patients ability to ambulate with decreased pain and improved mechanics. 10 min Neuromuscular Re-education:  []  See flow sheet : Rockerboard taps, tandem stance on foam   Rationale: improve balance and increase proprioception  to improve the patients ability to ambulate with decreased discomfort and improved mechanics.                                                                With   []?????? TE   []?????? TA   []?????? Neuro   []?????? SC   []?????? other: Patient Education: [x]?????? Review HEP    []?????? Progressed/Changed HEP based on:   []?????? positioning   []?????? body mechanics   []?????? transfers   [x]?????? heat/ice application    []?????? other:       Other Objective/Functional Measures:           Knee ROM: Flexion: 3-0-134      Pain Level (0-10 scale) post treatment: 0/10     ASSESSMENT/Changes in Function:   Pt progressing through therapeutic exercises with no complaints of pain, just increased fatigue with CKC exercises. Challenged patient with eccentric calf raises with weight shifting to the R. Increased step up height; fatiguing quickly but no pain. Added proprioceptive exercises, which patient tolerated well.    Patient will continue to benefit from skilled PT services to modify and progress therapeutic interventions, address functional mobility deficits, address ROM deficits, address strength deficits, analyze and address soft tissue restrictions, analyze and cue movement patterns, analyze and modify body mechanics/ergonomics, assess and modify postural abnormalities, address imbalance/dizziness and instruct in home and community integration to attain remaining goals. Treatment was performed with direct supervision by Kim Rai, PT, DPT.      [x]??????  See Plan of Care  []??????  See progress note/recertification  []??????  See Discharge Summary         Progress towards goals / Updated goals:  Short Term Goals: To be accomplished in 6 weeks:               1. Pt will be independent and compliant with HEP. -MET  2) Pt will demonstrate Knee extension >/= to 0 degrees ext to aid in ambulation -MET  3) Pt will be able to Ambulate with more normalized gait pattern on level terrain - Progressing  4) Pt will demonstrate Knee flexion >/= to 120 to aid in sitting  -MET  Long Term Goals: To be accomplished in 12 week:               1. Pt will be independent and compliant with HEP. -MET               2. Pt will improve FOTO score by the MCID demonstrating improved overall function with decreased pain or discomfort.               3. Pt will be able to Navigate a flight of stairs without pain - Progressing  4) Pt will be able to Ambulate on uneven terrain without pain or instability - Progressing  5) Pt will be able to Ambulate >/= 1 mile without pain -Progressing  6) Pt will demonstrate Knee flexion >/= 120 degrees to aid in sitting/squatting -MET  7) Pt will be able to return to running >/=1 mile without pain. - Progressing  8) Pt will be able to squat to 90 degrees knee flexion with BW resistance without complaints of pain.  - Progressing     PLAN   [x]??????  Upgrade activities as tolerated     [x]??????  Continue plan of care  []??????  Update interventions per flow sheet       []??????  Discharge due to:_  []??????  Other:_      Flavio Rascon, [facility] 2/3/2021

## 2021-02-03 NOTE — PROGRESS NOTES
Physical Therapy at [facility],   a part of [facility]  [address] [address], [address] [place] [address], [address] [place] [address]  Phone: [phone] Fax: [phone]    Progress Note    Name: Jorge Cho   : 1995   MD: Mendez Lutz MD       Treatment Diagnosis: Knee pain, right [M25.561]  Start of Care: 21    Visits from Start of Care: 9  Missed Visits: 1    Summary of Care: Mr. Charlie Gibson has been seen for 9 skilled physical therapy sessions s/p right ACL surgery. Therapy sessions have consisted of manual interventions, e-stim of right quadriceps, neuromuscular re-education, and therapeutic exercises. Maryanne Marshall has made great progress in terms of his range of motion and quadriceps activation. We have progressed to more closed chain strength exercises and proprioceptive training at this point. At this point he has been weaned from his crutches and continues to use his brace unlocked for ambulation. Pain is minimal and tingling/cold sensations in lower leg have significantly improved. Maryanne Marshall would benefit from continued skilled physical therapy sessions to continue to improve strength, functional mobility and normalize his gait mechanics. Thank you for this referral!     Assessment / Recommendations: Maryanne Marshall would benefit from continued skilled physical therapy sessions to continue to improve strength, functional mobility and normalize his gait mechanics. Objective:  Knee ROM: 4-0-133  Straight leg raise x10 reps - no extension lag    Goal:  Progress towards goals / Updated goals:  Short Term Goals: To be accomplished in 6 weeks:               1.  Pt will be independent and compliant with HEP. -MET  2) Pt will demonstrate Knee extension >/= to 0 degrees ext to aid in ambulation -MET  3) Pt will be able to Ambulate with more normalized gait pattern on level terrain - Progressing  4) Pt will demonstrate Knee flexion >/= to 120 to aid in sitting  -MET  Long Term Goals: To be accomplished in 12 week:               1. Pt will be independent and compliant with HEP. -MET               2. Pt will improve FOTO score by the MCID demonstrating improved overall function with decreased pain or discomfort.               3. Pt will be able to Navigate a flight of stairs without pain - Progressing  4) Pt will be able to Ambulate on uneven terrain without pain or instability - Progressing  5) Pt will be able to Ambulate >/= 1 mile without pain -Progressing  6) Pt will demonstrate Knee flexion >/= 120 degrees to aid in sitting/squatting -MET  7) Pt will be able to return to running >/=1 mile without pain. - Progressing  8) Pt will be able to squat to 90 degrees knee flexion with BW resistance without complaints of pain. - Progressing    Treatment was performed with direct supervision by River Martins, PT, DPT. Tobin Yin, SPT 2/3/2021     ________________________________________________________________________  NOTE TO PHYSICIAN:  Please complete the following and fax to: [facility]: Fax: [phone]. Isak King Retain this original for your records. If you are unable to process this request in 24 hours, please contact our office.        ____ I have read the above report and request that my patient continue therapy with the following changes/special instructions:  ____ I have read the above report and request that my patient be discharged from therapy    Physician's Signature:_________________ Date:___________Time:__________

## 2021-02-10 ENCOUNTER — HOSPITAL ENCOUNTER (OUTPATIENT)
Dept: PHYSICAL THERAPY | Age: 26
Discharge: HOME | End: 2021-02-10
Payer: COMMERCIAL

## 2021-02-10 PROCEDURE — 97110 THERAPEUTIC EXERCISES: CPT

## 2021-02-10 PROCEDURE — 97112 NEUROMUSCULAR REEDUCATION: CPT

## 2021-02-10 NOTE — PROGRESS NOTES
PT DAILY TREATMENT NOTE - [facility] 2-15    Patient Name: Jayshree Pitt  Date:2/10/2021  : 1995  [x]  Patient  Verified  Payor: Elvia Overall / Plan: Jefferson Setter / Product Type: HMO /    In time:10:00A  Out time:11:20A  Total Treatment Time (min): 80  Total Timed Codes (min): 65  1:1 Treatment Time ([facility] only): 48   Visit #:  9    Treatment Area: Knee pain, right [M25.561]    SUBJECTIVE  Pain Level (0-10 scale): 0/10  Any medication changes, allergies to medications, adverse drug reactions, diagnosis change, or new procedure performed?: [x]??????? No    []??????? Yes (see summary sheet for update)  Subjective functional status/changes:   []??????? No changes reported  Pt reports doing well. Just got back from [place]. Knee tolerated traveling well.     OBJECTIVE                 Modality rationale: decrease edema, decrease inflammation and decrease pain to improve the patients ability to ambulate with decreased pain and more normalized mechanics.     Min Type Additional Details     []???????? Estim: []?????? ?? Att   []???????? Unatt        []????????TENS instruct                  []????????IFC  []???????? Premod   [x]????????NMES                     []???????? Other:  []????????w/US   []????????w/ice   []????????w/heat  Position:    Location:      []????????  Traction: []???????? Cervical       []????????Lumbar                       []???????? Prone          []???????? Supine                       []?????? ? ? Intermittent   []???????? Continuous Lbs:  []???????? before manual  []???????? after manual  []????????w/heat     []????????  Ultrasound: []???????? Continuous   []???????? Pulsed at:                           []????????1MHz   []????????3MHz Location:  W/cm2:     []???????? Paraffin         Location:   []????????w/heat    15  [x]????????  Ice     []????????  Heat  []????????  Ice massage Position:seated   Location:R knee     []????????  Laser  []????????  Other: Position:  Location:       []????????  Vasopneumatic Device Pressure:       []???????? lo []???????? med []???????? hi   Temperature:       [x]???????? Skin assessment post-treatment:  [x]????????intact []????????redness- no adverse reaction    []????????redness  adverse reaction:      50 min Therapeutic Exercise:  [x]???????? See flow sheet : R knee flexion/extension PROM   Rationale: increase ROM, increase strength and increase proprioception to improve the patients ability to ambulate with decreased pain and improved mechanics.    15 min Neuromuscular Re-education:  [x]? See flow sheet : Rockerboard taps, tandem stance on foam   Rationale: improve balance and increase proprioception  to improve the patients ability to ambulate with decreased discomfort and improved mechanics.                                                                With   []??????? TE   []??????? TA   []??????? Neuro   []??????? SC   []??????? other: Patient Education: [x]??????? Review HEP    []??????? Progressed/Changed HEP based on:   []??????? positioning   []??????? body mechanics   []??????? transfers   [x]??????? heat/ice application    []??????? other:       Other Objective/Functional Measures:           Knee ROM: Flexion: 133  Knee ROM: Extension: +4     Pain Level (0-10 scale) post treatment: 0/10     ASSESSMENT/Changes in Function:   Pt able to complete 10 reps of SLR flexion without any extension lag. Fatigue does present but he is able to maintain control.    Patient will continue to benefit from skilled PT services to modify and progress therapeutic interventions, address functional mobility deficits, address ROM deficits, address strength deficits, analyze and address soft tissue restrictions, analyze and cue movement patterns, analyze and modify body mechanics/ergonomics, assess and modify postural abnormalities, address imbalance/dizziness and instruct in home and community integration to attain remaining goals.     [x]???????  See Plan of Care  []???????  See progress note/recertification  []???????  See Discharge Summary         Progress towards goals / Updated goals:  Short Term Goals: To be accomplished in 6 weeks:               1. Pt will be independent and compliant with HEP. -MET  2) Pt will demonstrate Knee extension >/= to 0 degrees ext to aid in ambulation -MET  3) Pt will be able to Ambulate with more normalized gait pattern on level terrain - Progressing  4) Pt will demonstrate Knee flexion >/= to 120 to aid in sitting  -MET  Long Term Goals: To be accomplished in 12 week:               1. Pt will be independent and compliant with HEP. -MET               2. Pt will improve FOTO score by the MCID demonstrating improved overall function with decreased pain or discomfort.               3. Pt will be able to Navigate a flight of stairs without pain - Progressing  4) Pt will be able to Ambulate on uneven terrain without pain or instability - Progressing  5) Pt will be able to Ambulate >/= 1 mile without pain -Progressing  6) Pt will demonstrate Knee flexion >/= 120 degrees to aid in sitting/squatting -MET  7) Pt will be able to return to running >/=1 mile without pain. - Progressing  8) Pt will be able to squat to 90 degrees knee flexion with BW resistance without complaints of pain.  - Progressing     PLAN   [x]???????  Upgrade activities as tolerated     [x]???????  Continue plan of care  []???????  Update interventions per flow sheet       []???????  Discharge due to:_  []???????  Other:_        Ladi Franks PTA, [facility] 2/10/2021

## 2021-02-12 ENCOUNTER — APPOINTMENT (OUTPATIENT)
Dept: PHYSICAL THERAPY | Age: 26
End: 2021-02-12
Payer: COMMERCIAL

## 2021-02-18 ENCOUNTER — APPOINTMENT (OUTPATIENT)
Dept: PHYSICAL THERAPY | Age: 26
End: 2021-02-18
Payer: COMMERCIAL

## 2021-02-23 ENCOUNTER — HOSPITAL ENCOUNTER (OUTPATIENT)
Dept: PHYSICAL THERAPY | Age: 26
Discharge: HOME | End: 2021-02-23
Payer: COMMERCIAL

## 2021-02-23 PROCEDURE — 97110 THERAPEUTIC EXERCISES: CPT

## 2021-02-23 PROCEDURE — 97112 NEUROMUSCULAR REEDUCATION: CPT

## 2021-02-23 NOTE — PROGRESS NOTES
PT DAILY TREATMENT NOTE - [facility] 2-15    Patient Name: Chandu Vieira  Date:2021  : 1995  [x]  Patient  Verified  Payor: Philly Mora / Plan: Jayshree Araiza / Product Type: HMO /    In time:9:35A  Out time: 10:55A  Total Treatment Time (min): 80  Total Timed Codes (min): 80  1:1 Treatment Time ([address] only): 74   Visit #:  10    Treatment Area: Knee pain, right [M25.561]    SUBJECTIVE  Pain Level (0-10 scale): 0/10  Any medication changes, allergies to medications, adverse drug reactions, diagnosis change, or new procedure performed?: [x]??????? No    []??????? Yes (see summary sheet for update)  Subjective functional status/changes:   []??????? No changes reported  Pt reports doing well. FU with MD went well. He is out of the brace completely now.      OBJECTIVE     60 min Therapeutic Exercise:  [x]???????? See flow sheet : R knee flexion/extension PROM   Rationale: increase ROM, increase strength and increase proprioception to improve the patients ability to ambulate with decreased pain and improved mechanics.     20 min Neuromuscular Re-education:  [x]? See flow sheet : Rockerboard taps, tandem stance on foam   Rationale: improve balance and increase proprioception  to improve the patients ability to ambulate with decreased discomfort and improved mechanics.                                                                With   []??????? TE   []??????? TA   []??????? Neuro   []??????? SC   []??????? other: Patient Education: [x]??????? Review HEP    []??????? Progressed/Changed HEP based on:   []??????? positioning   []??????? body mechanics   []??????? transfers   [x]??????? heat/ice application    []??????? other:       Other Objective/Functional Measures:           Knee ROM: Flexion: 138  Knee ROM: Extension: +5     Pain Level (0-10 scale) post treatment: 0/10     ASSESSMENT/Changes in Function:   Pt tolerated session well. Advanced hip engagement with t band on bridges today.  Added 1.5# weight for SLR.   Patient will continue to benefit from skilled PT services to modify and progress therapeutic interventions, address functional mobility deficits, address ROM deficits, address strength deficits, analyze and address soft tissue restrictions, analyze and cue movement patterns, analyze and modify body mechanics/ergonomics, assess and modify postural abnormalities, address imbalance/dizziness and instruct in home and community integration to attain remaining goals.     [x]???????  See Plan of Care  []???????  See progress note/recertification  []???????  See Discharge Summary         Progress towards goals / Updated goals:  Short Term Goals: To be accomplished in 6 weeks:               1. Pt will be independent and compliant with HEP. -MET  2) Pt will demonstrate Knee extension >/= to 0 degrees ext to aid in ambulation -MET  3) Pt will be able to Ambulate with more normalized gait pattern on level terrain - Progressing  4) Pt will demonstrate Knee flexion >/= to 120 to aid in sitting  -MET  Long Term Goals: To be accomplished in 12 week:               1. Pt will be independent and compliant with HEP. -MET               2. Pt will improve FOTO score by the MCID demonstrating improved overall function with decreased pain or discomfort.               3. Pt will be able to Navigate a flight of stairs without pain - Progressing  4) Pt will be able to Ambulate on uneven terrain without pain or instability - Progressing  5) Pt will be able to Ambulate >/= 1 mile without pain -Progressing  6) Pt will demonstrate Knee flexion >/= 120 degrees to aid in sitting/squatting -MET  7) Pt will be able to return to running >/=1 mile without pain. - Progressing  8) Pt will be able to squat to 90 degrees knee flexion with BW resistance without complaints of pain. - Progressing     PLAN   [x]???????  Upgrade activities as tolerated     [x]???????  Continue plan of care  []???????  Update interventions per flow sheet        []???????  Discharge due to:_  []???????  Other:_        Jennifer Fitting, PTA, [facility] 2/23/2021

## 2021-02-26 ENCOUNTER — HOSPITAL ENCOUNTER (OUTPATIENT)
Dept: PHYSICAL THERAPY | Age: 26
Discharge: HOME | End: 2021-02-26
Payer: COMMERCIAL

## 2021-02-26 PROCEDURE — 97016 VASOPNEUMATIC DEVICE THERAPY: CPT

## 2021-02-26 PROCEDURE — 97110 THERAPEUTIC EXERCISES: CPT

## 2021-02-26 PROCEDURE — 97112 NEUROMUSCULAR REEDUCATION: CPT

## 2021-02-26 NOTE — PROGRESS NOTES
PT DAILY TREATMENT NOTE - [facility] 2-15    Patient Name: Rico Medrano  Date:2021  : 1995  [x]  Patient  Verified  Payor: Cherri Fees / Plan: Balbir Brunson / Product Type: HMO /    In time:10:15  Out time:11:30  Total Treatment Time (min): 75  Total Timed Codes (min): 60  1:1 Treatment Time (MC only): 41  Visit #:  11    Treatment Area: Knee pain, right [M25.561]    SUBJECTIVE  Pain Level (0-10 scale): 0/10  Any medication changes, allergies to medications, adverse drug reactions, diagnosis change, or new procedure performed?: [x]???????? No    []???????? Yes (see summary sheet for update)  Subjective functional status/changes:   []???????? No changes reported  Pt reports he has been doing well, he was a little sore after last session, but otherwise feels good. OBJECTIVE                Modality rationale: decrease edema, decrease inflammation and decrease pain to improve the patients ability to ambulate with decreased pain and more normalized mechanics.     Min Type Additional Details     []???????? Estim: []?????? ?? Att   []???????? Unatt        []????????TENS instruct                  []????????IFC  []???????? Premod   [x]????????NMES                     []???????? Other:  []????????w/US   []????????w/ice   []????????w/heat  Position:    Location:      []????????  Traction: []???????? Cervical       []????????Lumbar                       []???????? Prone          []???????? Supine                       []?????? ? ? Intermittent   []???????? Continuous Lbs:  []???????? before manual  []???????? after manual  []????????w/heat     []????????  Ultrasound: []???????? Continuous   []???????? Pulsed at:                           []????????1MHz   []????????3MHz Location:  W/cm2:     []???????? Paraffin         Location:   []????????w/heat     []????????  Ice     []????????  Heat  []????????  Ice massage Position:  Location:     []????????  Laser  []????????  Other: Position:  Location:   15    [x]????????  Vasopneumatic Device Pressure:       []???????? lo [x]???????? med []???????? hi   Temperature: 34      [x]???????? Skin assessment post-treatment:  [x]????????intact []????????redness- no adverse reaction    []????????redness  adverse reaction:       45 min Therapeutic Exercise:  [x]????????? See flow sheet : R knee flexion/extension PROM   Rationale: increase ROM, increase strength and increase proprioception to improve the patients ability to ambulate with decreased pain and improved mechanics.    15 min Neuromuscular Re-education:  [x]? ?  See flow sheet : Rockerboard taps and hold, SLS on foam   Rationale: improve balance and increase proprioception  to improve the patients ability to ambulate with decreased discomfort and improved mechanics.                                                                   With   []???????? TE   []???????? TA   []???????? Neuro   []???????? SC   []???????? other: Patient Education: [x]???????? Review HEP    []???????? Progressed/Changed HEP based on:   []???????? positioning   []???????? body mechanics   []???????? transfers   [x]???????? heat/ice application    []???????? other:       Other Objective/Functional Measures:     80 degrees with 2x30\" wall squats          Knee ROM: Flexion: 138  Knee ROM: Extension: +5     Pain Level (0-10 scale) post treatment: 0/10     ASSESSMENT/Changes in Function:   Pt feeling fatigued with advances in therapeutic exercises, but tolerated with no increased pain. Pt able to tolerate wall squats at 80 degrees knee flexion with fatigue beginning around 15-20 seconds. Pt able to advance to SL calf raises.     Patient will continue to benefit from skilled PT services to modify and progress therapeutic interventions, address functional mobility deficits, address ROM deficits, address strength deficits, analyze and address soft tissue restrictions, analyze and cue movement patterns, analyze and modify body mechanics/ergonomics, assess and modify postural abnormalities, address imbalance/dizziness and instruct in home and community integration to attain remaining goals. Treatment was performed with direct supervision by Gavi Chinchilla, PT, DPT.      [x]????????  See Plan of Care  []????????  See progress note/recertification  []????????  See Discharge Summary         Progress towards goals / Updated goals:  Short Term Goals: To be accomplished in 6 weeks:               1. Pt will be independent and compliant with HEP. -MET  2) Pt will demonstrate Knee extension >/= to 0 degrees ext to aid in ambulation -MET  3) Pt will be able to Ambulate with more normalized gait pattern on level terrain - Progressing  4) Pt will demonstrate Knee flexion >/= to 120 to aid in sitting  -MET  Long Term Goals: To be accomplished in 12 week:               1.  Pt will be independent and compliant with HEP. -MET               2. Pt will improve FOTO score by the MCID demonstrating improved overall function with decreased pain or discomfort.               3. Pt will be able to Navigate a flight of stairs without pain - Progressing  4) Pt will be able to Ambulate on uneven terrain without pain or instability -MET  5) Pt will be able to Ambulate >/= 1 mile without pain -Progressing  6) Pt will demonstrate Knee flexion >/= 120 degrees to aid in sitting/squatting -MET  7) Pt will be able to return to running >/=1 mile without pain. - Progressing  8) Pt will be able to squat to 90 degrees knee flexion with BW resistance without complaints of pain. - Progressing     PLAN   [x]????????  Upgrade activities as tolerated     [x]????????  Continue plan of care  []????????  Update interventions per flow sheet       []????????  Discharge due to:_  []????????  Other:_      Mana Swift, [facility] 2/26/2021

## 2021-03-01 ENCOUNTER — HOSPITAL ENCOUNTER (OUTPATIENT)
Dept: PHYSICAL THERAPY | Age: 26
Discharge: HOME | End: 2021-03-01
Payer: COMMERCIAL

## 2021-03-01 PROCEDURE — 97112 NEUROMUSCULAR REEDUCATION: CPT

## 2021-03-01 PROCEDURE — 97016 VASOPNEUMATIC DEVICE THERAPY: CPT

## 2021-03-01 PROCEDURE — 97110 THERAPEUTIC EXERCISES: CPT

## 2021-03-01 NOTE — PROGRESS NOTES
PT DAILY TREATMENT NOTE - [facility] 2-15    Patient Name: Adrianne Saavedra  Date:3/1/2021  : 1995  [x]  Patient  Verified  Payor: Sussy Bunn / Plan: Jeremy Cervantes / Product Type: HMO /    In time:7:02am  Out time: 8:33am  Total Treatment Time (min): 91  Total Timed Codes (min): 76  1:1 Treatment Time ( only): 69  Visit #:  12    Treatment Area: Knee pain, right [M25.561]    SUBJECTIVE  Pain Level (0-10 scale): 0/10  Any medication changes, allergies to medications, adverse drug reactions, diagnosis change, or new procedure performed?: [x]????????? No    []????????? Yes (see summary sheet for update)  Subjective functional status/changes:   []????????? No changes reported  Pt reports he walked on an incline on the treadmill over the weekend for >1 mile and he had no pain.      OBJECTIVE                 Modality rationale: decrease edema, decrease inflammation and decrease pain to improve the patients ability to ambulate with decreased pain and more normalized mechanics.     Min Type Additional Details     []????????? Estim: []??????? ?? Att   []????????? Unatt        []?????????TENS instruct                  []?????????IFC  []????????? Premod   [x]?????????NMES                     []????????? Other:  []?????????w/US   []?????????w/ice   []?????????w/heat  Position:    Location:      []?????????  Traction: []????????? Cervical       []?????????Lumbar                       []????????? Prone          []????????? Supine                       []??????? ? ? Intermittent   []????????? Continuous Lbs:  []????????? before manual  []????????? after manual  []?????????w/heat     []?????????  Ultrasound: []????????? Continuous   []????????? Pulsed at:                           []?????????1MHz   []?????????3MHz Location:  W/cm2:     []????????? Paraffin         Location:   []?????????w/heat     []?????????  Ice     []?????????  Heat  []?????????  Ice massage Position:  Location:     []?????????  Laser  []?????????  Other: Position:  Location:   15    [x]?????????  Vasopneumatic Device Pressure:       []????????? lo [x]????????? med []????????? hi   Temperature: 34      [x]????????? Skin assessment post-treatment:  [x]?????????intact []?????????redness- no adverse reaction    []?????????redness  adverse reaction:       58 min Therapeutic Exercise:  [x]?????????? See flow sheet:    Rationale: increase ROM, increase strength and increase proprioception to improve the patients ability to ambulate with decreased pain and improved mechanics.    18 min Neuromuscular Re-education:  [x]? ??  See flow sheet : Rockerboard taps and hold, SLS on foam, BOSU step-up   Rationale: improve balance and increase proprioception  to improve the patients ability to ambulate with decreased discomfort and improved mechanics.                                                                   With   []????????? TE   []????????? TA   []????????? Neuro   []????????? SC   []????????? other: Patient Education: [x]????????? Review HEP    []????????? Progressed/Changed HEP based on:   []????????? positioning   []????????? body mechanics   []????????? transfers   [x]????????? heat/ice application    []????????? other:       Other Objective/Functional Measures:            Knee ROM: Flexion: 140  Knee ROM: Extension: +5     Pain Level (0-10 scale) post treatment: 0/10     ASSESSMENT/Changes in Function:   Pt challenged with Swiss Ball hamstring curls today, but no reports of pain. Pt muscular endurance improved since last session, only fatiguing with last set. Incorporating contralateral strengthening as well.    Patient will continue to benefit from skilled PT services to modify and progress therapeutic interventions, address functional mobility deficits, address ROM deficits, address strength deficits, analyze and address soft tissue restrictions, analyze and cue movement patterns, analyze and modify body mechanics/ergonomics, assess and modify postural abnormalities, address imbalance/dizziness and instruct in home and community integration to attain remaining goals.   Treatment was performed with direct supervision by eRyes Bolton, PT, DPT.     []?????????  See Plan of Care  [x]?????????  See progress note/recertification  []?????????  See Discharge Summary         Progress towards goals / Updated goals:  Short Term Goals: To be accomplished in 6 weeks:               1. Pt will be independent and compliant with HEP. -MET  2) Pt will demonstrate Knee extension >/= to 0 degrees ext to aid in ambulation -MET  3) Pt will be able to Ambulate with more normalized gait pattern on level terrain - MET  4) Pt will demonstrate Knee flexion >/= to 120 to aid in sitting  -MET  Long Term Goals: To be accomplished in 12 week:               1.  Pt will be independent and compliant with HEP. -MET               2. Pt will improve FOTO score by the MCID demonstrating improved overall function with decreased pain or discomfort.               3. Pt will be able to Navigate a flight of stairs without pain - MET  4) Pt will be able to Ambulate on uneven terrain without pain or instability -MET  5) Pt will be able to Ambulate >/= 1 mile without pain -MET  6) Pt will demonstrate Knee flexion >/= 120 degrees to aid in sitting/squatting -MET  7) Pt will be able to return to running >/=1 mile without pain. - Progressing  8) Pt will be able to squat to 90 degrees knee flexion with BW resistance without complaints of pain. - Progressing (pain during initial concentric portion.)     PLAN   [x]?????????  Upgrade activities as tolerated     [x]?????????  Continue plan of care  []?????????  Update interventions per flow sheet       []?????????  Discharge due to:_  []?????????  Other:_        Candice Rivera, SPT 3/1/2021

## 2021-03-01 NOTE — PROGRESS NOTES
Physical Therapy at [facility],   a part of [address], [address] [place] [address]  Phone: [phone] Fax: [phone]    Progress Note    Name: Alex Curtis   : 1995   MD: Elizabeth Shah MD       Treatment Diagnosis: Knee pain, right [M25.561]  Start of Care: 21    Visits from Start of Care: 12   Missed Visits: 1    Summary of Care:Mr. Anne Garcia has been seen for 12 skilled physical therapy sessions s/p right ACL surgery. Therapy sessions have consisted of manual interventions, neuromuscular re-education, therapeutic exercises, therapeutic activities and modalities. We have continued to progress with closed chain lower extremity strengthening and balance while also addressing his contralateral side. Pt is demonstrating improvements in his strength, functional mobility and muscular endurance. He is not having any pain at this point and is able to walk over a mile with no complaints of pain. Alicja Johnson would benefit from continued skilled physical therapy sessions to continue to improve strength, functional mobility and return to dynamic activity/running. Thank you for this referral!      Assessment / Recommendations: Alicja Johnson would benefit from continued skilled physical therapy sessions to continue to improve strength, functional mobility and return to dynamic activity/running. Knee ROM: Flexion: 140  Knee ROM: Extension: +5    Progress towards goals / Updated goals:  Short Term Goals: To be accomplished in 6 weeks:               1. Pt will be independent and compliant with HEP. -MET  2) Pt will demonstrate Knee extension >/= to 0 degrees ext to aid in ambulation -MET  3) Pt will be able to Ambulate with more normalized gait pattern on level terrain - MET  4) Pt will demonstrate Knee flexion >/= to 120 to aid in sitting  -MET  Long Term Goals: To be accomplished in 12 week:               1.  Pt will be independent and compliant with HEP.  -MET               2. Pt will improve FOTO score by the MCID demonstrating improved overall function with decreased pain or discomfort.               3. Pt will be able to Navigate a flight of stairs without pain - MET  4) Pt will be able to Ambulate on uneven terrain without pain or instability -MET  5) Pt will be able to Ambulate >/= 1 mile without pain -MET  6) Pt will demonstrate Knee flexion >/= 120 degrees to aid in sitting/squatting -MET  7) Pt will be able to return to running >/=1 mile without pain. - Progressing  8) Pt will be able to squat to 90 degrees knee flexion with BW resistance without complaints of pain. - Progressing (pain during initial concentric portion.)    Treatment was performed with direct supervision by Kathy Christopher, PT, DPT.      Crissy Porras, SPT 3/1/2021

## 2021-03-10 ENCOUNTER — HOSPITAL ENCOUNTER (OUTPATIENT)
Dept: PHYSICAL THERAPY | Age: 26
Discharge: HOME | End: 2021-03-10
Payer: COMMERCIAL

## 2021-03-10 PROCEDURE — 97110 THERAPEUTIC EXERCISES: CPT

## 2021-03-10 PROCEDURE — 97112 NEUROMUSCULAR REEDUCATION: CPT

## 2021-03-10 PROCEDURE — 97016 VASOPNEUMATIC DEVICE THERAPY: CPT

## 2021-03-10 NOTE — PROGRESS NOTES
PT DAILY TREATMENT NOTE - [facility] 2-15    Patient Name: Jackie Powell  Date:3/10/2021  : 1995  [x]  Patient  Verified  Payor: Cat Matute / Plan: Shonda Stammer / Product Type: HMO /    In time:7:03am  Out time: 8:07am  Total Treatment Time (min): 64  Total Timed Codes (min): 49  1:1 Treatment Time ( [address] only): 37  Visit #:  13    Treatment Area: Knee pain, right [M25.561]    SUBJECTIVE  Pain Level (0-10 scale): 0/10   Any medication changes, allergies to medications, adverse drug reactions, diagnosis change, or new procedure performed?: [x]?????????? No    []?????????? Yes (see summary sheet for update)  Subjective functional status/changes:   []?????????? No changes reported  Pt reports his knee has been doing well. Saw MD on 3/9, said everything looked good, he was happy with his progress. MD suggested wearing a brace for lateral and dynamic activity; will discuss more at next follow-up visit with MD in 6 weeks. OBJECTIVE                 Modality rationale: decrease edema, decrease inflammation and decrease pain to improve the patients ability to ambulate with decreased pain and more normalized mechanics.     Min Type Additional Details     []?????????? Estim: []???????? ?? Att   []?????????? Unatt        []??????????TENS instruct                  []??????????IFC  []?????????? Premod   [x]??????????NMES                     []?????????? Other:  []??????????w/US   []??????????w/ice   []??????????w/heat  Position:    Location:      []??????????  Traction: []?????????? Cervical       []??????????Lumbar                       []?????????? Prone          []?????????? Supine                       []???????? ? ? Intermittent   []?????????? Continuous Lbs:  []?????????? before manual  []?????????? after manual  []??????????w/heat     []??????????  Ultrasound: []?????????? Continuous   []?????????? Pulsed at:                           []??????????1MHz   []??????????3MHz Location:  W/cm2:     []?????????? Paraffin         Location:   []??????????w/heat     []??????????  Ice     []??????????  Heat  []??????????  Ice massage Position:  Location:     []??????????  Laser  []??????????  Other: Position:  Location:   15    [x]??????????  Vasopneumatic Device Pressure:       []?????????? lo [x]?????????? med []?????????? hi   Temperature: 34      [x]?????????? Skin assessment post-treatment:  [x]??????????intact []??????????redness- no adverse reaction    []??????????redness  adverse reaction:       37 min Therapeutic Exercise:  [x]??????????? See flow sheet:    Rationale: increase ROM, increase strength and increase proprioception to improve the patients ability to ambulate with decreased pain and improved mechanics.    12 min Neuromuscular Re-education:  [x]? ???  See flow sheet : Rockerboard taps and hold, SLS on foam, tandem foam rebounder    Rationale: improve balance and increase proprioception  to improve the patients ability to ambulate with decreased discomfort and improved mechanics.                                                                   With   []?????????? TE   []?????????? TA   []?????????? Neuro   []?????????? SC   []?????????? other: Patient Education: [x]?????????? Review HEP     []?????????? Progressed/Changed HEP based on:   []?????????? positioning   []?????????? body mechanics   []?????????? transfers   [x]?????????? heat/ice application    []?????????? other:       Other Objective/Functional Measures: --            Pain Level (0-10 scale) post treatment: 0/10     ASSESSMENT/Changes in Function:   Pt challenged with SWB  triple threat for HS strengthening today, but no reports of pain. Pt able to perform wall sits with fatigue beginning around 45 seconds. Pt is challenged/fatigued with step ups. Will continue to progress dynamic hip and lower extremity strengthening in all directions.  Patient will continue to benefit from skilled PT services to modify and progress therapeutic interventions, address functional mobility deficits, address ROM deficits, address strength deficits, analyze and address soft tissue restrictions, analyze and cue movement patterns, analyze and modify body mechanics/ergonomics, assess and modify postural abnormalities, address imbalance/dizziness and instruct in home and community integration to attain remaining goals.   Treatment was performed with direct supervision by Macy Galvan, PT, DPT.     []??????????  See Plan of Care  [x]??????????  See progress note/recertification  []??????????  See Discharge Summary         Progress towards goals / Updated goals:  Short Term Goals: To be accomplished in 6 weeks:               1. Pt will be independent and compliant with HEP. -MET  2) Pt will demonstrate Knee extension >/= to 0 degrees ext to aid in ambulation -MET  3) Pt will be able to Ambulate with more normalized gait pattern on level terrain - MET  4) Pt will demonstrate Knee flexion >/= to 120 to aid in sitting  -MET  Long Term Goals: To be accomplished in 12 week:               1.  Pt will be independent and compliant with HEP. -MET               2. Pt will improve FOTO score by the MCID demonstrating improved overall function with decreased pain or discomfort.               3. Pt will be able to Navigate a flight of stairs without pain - MET  4) Pt will be able to Ambulate on uneven terrain without pain or instability -MET  5) Pt will be able to Ambulate >/= 1 mile without pain -MET  6) Pt will demonstrate Knee flexion >/= 120 degrees to aid in sitting/squatting -MET  7) Pt will be able to return to running >/=1 mile without pain. - Progressing  8) Pt will be able to squat to 90 degrees knee flexion with BW resistance without complaints of pain. - Progressing (pain during initial concentric portion.)     PLAN   [x]??????????  Upgrade activities as tolerated     [x]??????????  Continue plan of care  []??????????  Update interventions per flow sheet       []??????????  Discharge due to:_  []??????????  Other:_         Aundrea Rodriguez, SPT 3/10/2021

## 2021-03-12 ENCOUNTER — HOSPITAL ENCOUNTER (OUTPATIENT)
Dept: PHYSICAL THERAPY | Age: 26
Discharge: HOME | End: 2021-03-12
Payer: COMMERCIAL

## 2021-03-12 PROCEDURE — 97016 VASOPNEUMATIC DEVICE THERAPY: CPT

## 2021-03-12 PROCEDURE — 97110 THERAPEUTIC EXERCISES: CPT

## 2021-03-12 PROCEDURE — 97112 NEUROMUSCULAR REEDUCATION: CPT

## 2021-03-12 NOTE — PROGRESS NOTES
PT DAILY TREATMENT NOTE - [facility] 2-15    Patient Name: Jorge Cho  Date:3/12/2021  : 1995  [x]  Patient  Verified  Payor: Benjamin Joseph / Plan: Ирина Foley / Product Type: HMO /    In time:7:00am  Out time:8:07am  Total Treatment Time (min): 67  Total Timed Codes (min): 52  1:1 Treatment Time (MC only): 41  Visit #:  14    Treatment Area: Knee pain, right [M25.561]    SUBJECTIVE  Pain Level (0-10 scale): 0/10   Any medication changes, allergies to medications, adverse drug reactions, diagnosis change, or new procedure performed?: [x]??????????? No    []??????????? Yes (see summary sheet for update)  Subjective functional status/changes:   []??????????? No changes reported  Pt reports his knee has been feeling good since last session.      OBJECTIVE                 Modality rationale: decrease edema, decrease inflammation and decrease pain to improve the patients ability to ambulate with decreased pain and more normalized mechanics.     Min Type Additional Details     []??????????? Estim: []????????? ?? Att   []??????????? Unatt        []???????????TENS instruct                  []???????????IFC  []??????????? Premod   []???????????NMES                     []??????????? Other:  []???????????w/US   []???????????w/ice   []???????????w/heat  Position:    Location:      []???????????  Traction: []??????????? Cervical       []???????????Lumbar                       []??????????? Prone          []??????????? Supine                       []????????? ? ? Intermittent   []??????????? Continuous Lbs:  []??????????? before manual  []??????????? after manual  []???????????w/heat     []???????????  Ultrasound: []??????????? Continuous   []??????????? Pulsed at:                           []???????????1MHz   []???????????3MHz Location:  W/cm2:     []??????????? Paraffin         Location:   []???????????w/heat     []???????????  Ice     []???????????  Heat  []???????????  Ice massage Position:  Location:     []???????????  Laser  []???????????  Other: Position:  Location:   15    [x]???????????  Vasopneumatic Device Pressure:       []??????????? lo [x]??????????? med []??????????? hi   Temperature: 34      [x]??????????? Skin assessment post-treatment:  [x]???????????intact []???????????redness- no adverse reaction    []???????????redness  adverse reaction:       40 min Therapeutic Exercise:  [x]???????????? See flow sheet:    Rationale: increase ROM, increase strength and increase proprioception to improve the patients ability to ambulate with decreased pain and improved mechanics.    12 min Neuromuscular Re-education:  [x]? ????  See flow sheet : Rockerboard taps and hold, SLS foam rebounder    Rationale: improve balance and increase proprioception  to improve the patients ability to ambulate with decreased discomfort and improved mechanics.                                                                   With   []??????????? TE   []??????????? TA   []??????????? Neuro   []??????????? SC   []??????????? other: Patient Education: [x]??????????? Review HEP     []??????????? Progressed/Changed HEP based on:   []??????????? positioning   []??????????? body mechanics   []??????????? transfers   [x]??????????? heat/ice application    []??????????? other:       Other Objective/Functional Measures: --      Pain Level (0-10 scale) post treatment: 0/10     ASSESSMENT/Changes in Function:   Pt able to tolerate SL squats on the total gym on level 21 with noticeable fatigue towards later reps. Pt tolerated all other advances for therapeutic exercises/activities with no reports of pain. Will add forward lunges at next session to challenge quad/hip control.  Patient will continue to benefit from skilled PT services to modify and progress therapeutic interventions, address functional mobility deficits, address ROM deficits, address strength deficits, analyze and address soft tissue restrictions, analyze and cue movement patterns, analyze and modify body mechanics/ergonomics, assess and modify postural abnormalities, address imbalance/dizziness and instruct in home and community integration to attain remaining goals. Treatment was performed with direct supervision by Pasco Lennox, PT, DPT.      []???????????  See Plan of Care  [x]???????????  See progress note/recertification  []???????????  See Discharge Summary         Progress towards goals / Updated goals:  Short Term Goals: To be accomplished in 6 weeks:               1. Pt will be independent and compliant with HEP. -MET  2) Pt will demonstrate Knee extension >/= to 0 degrees ext to aid in ambulation -MET  3) Pt will be able to Ambulate with more normalized gait pattern on level terrain - MET  4) Pt will demonstrate Knee flexion >/= to 120 to aid in sitting  -MET  Long Term Goals: To be accomplished in 12 week:               1.  Pt will be independent and compliant with HEP. -MET               2. Pt will improve FOTO score by the MCID demonstrating improved overall function with decreased pain or discomfort.               3. Pt will be able to Navigate a flight of stairs without pain - MET  4) Pt will be able to Ambulate on uneven terrain without pain or instability -MET  5) Pt will be able to Ambulate >/= 1 mile without pain -MET  6) Pt will demonstrate Knee flexion >/= 120 degrees to aid in sitting/squatting -MET  7) Pt will be able to return to running >/=1 mile without pain. - Progressing  8) Pt will be able to squat to 90 degrees knee flexion with BW resistance without complaints of pain. - Progressing (pain during initial concentric portion.)     PLAN   [x]???????????  Upgrade activities as tolerated     [x]???????????  Continue plan of care  []???????????  Update interventions per flow sheet       []???????????  Discharge due to:_  []???????????  Other:_        Alejandro Edwards, SPT 3/12/2021

## 2021-03-17 ENCOUNTER — HOSPITAL ENCOUNTER (OUTPATIENT)
Dept: PHYSICAL THERAPY | Age: 26
Discharge: HOME | End: 2021-03-17
Payer: COMMERCIAL

## 2021-03-17 PROCEDURE — 97112 NEUROMUSCULAR REEDUCATION: CPT

## 2021-03-17 PROCEDURE — 97110 THERAPEUTIC EXERCISES: CPT

## 2021-03-17 NOTE — PROGRESS NOTES
PT DAILY TREATMENT NOTE - [facility] 2-15    Patient Name: Ruby Arriola  Date:3/17/2021  : 1995  [x]  Patient  Verified  Payor: Ashlyn Joe / Plan: Harish Nine / Product Type: HMO /    In time:7:48am  Out time:8:45am  Total Treatment Time (min): 57  Total Timed Codes (min): 57  1:1 Treatment Time ( only): 40  Visit #:  15    Treatment Area: Knee pain, right [M25.561]    SUBJECTIVE  Pain Level (0-10 scale): 0/10   Any medication changes, allergies to medications, adverse drug reactions, diagnosis change, or new procedure performed?: [x]???????????? No    []???????????? Yes (see summary sheet for update)  Subjective functional status/changes:   []???????????? No changes reported  Pt reports his knee is feeling good, his only complaint is pain at end range flexion.      OBJECTIVE      45 min Therapeutic Exercise:  [x]????????????? See flow sheet:    Rationale: increase ROM, increase strength and increase proprioception to improve the patients ability to ambulate with decreased pain and improved mechanics.    12 min Neuromuscular Re-education:  [x]??????  See flow sheet : Rockerboard taps and hold, SLS foam rebounder    Rationale: improve balance and increase proprioception  to improve the patients ability to ambulate with decreased discomfort and improved mechanics.                                                                   With   []???????????? TE   []???????????? TA   []???????????? Neuro   []???????????? SC   []???????????? other: Patient Education: [x]???????????? Review HEP     []???????????? Progressed/Changed HEP based on:   []???????????? positioning   []???????????? body mechanics   []???????????? transfers   [x]???????????? heat/ice application    []???????????? other:       Other Objective/Functional Measures: --      Pain Level (0-10 scale) post treatment: 0/10     ASSESSMENT/Changes in Function:   Pt challenged with forward lunges with R leg posterior, but tolerated without pain.  Will continue to advance strengthening in different planes and work towards SL 1 rep max to compare with contralateral leg. Patient will continue to benefit from skilled PT services to modify and progress therapeutic interventions, address functional mobility deficits, address ROM deficits, address strength deficits, analyze and address soft tissue restrictions, analyze and cue movement patterns, analyze and modify body mechanics/ergonomics, assess and modify postural abnormalities, address imbalance/dizziness and instruct in home and community integration to attain remaining goals. Treatment was performed with direct supervision by Kathy Christopher, PT, DPT.      []????????????  See Plan of Care  [x]????????????  See progress note/recertification  []????????????  See Discharge Summary         Progress towards goals / Updated goals:  Short Term Goals: To be accomplished in 6 weeks:               1. Pt will be independent and compliant with HEP. -MET  2) Pt will demonstrate Knee extension >/= to 0 degrees ext to aid in ambulation -MET  3) Pt will be able to Ambulate with more normalized gait pattern on level terrain - MET  4) Pt will demonstrate Knee flexion >/= to 120 to aid in sitting  -MET  Long Term Goals: To be accomplished in 12 week:               1.  Pt will be independent and compliant with HEP. -MET               2. Pt will improve FOTO score by the MCID demonstrating improved overall function with decreased pain or discomfort.               3. Pt will be able to Navigate a flight of stairs without pain - MET  4) Pt will be able to Ambulate on uneven terrain without pain or instability -MET  5) Pt will be able to Ambulate >/= 1 mile without pain -MET  6) Pt will demonstrate Knee flexion >/= 120 degrees to aid in sitting/squatting -MET  7) Pt will be able to return to running >/=1 mile without pain. - Progressing  8) Pt will be able to squat to 90 degrees knee flexion with BW resistance without complaints of pain. - Progressing (pain during initial concentric portion.)     PLAN   [x]????????????  Upgrade activities as tolerated     [x]????????????  Continue plan of care  []????????????  Update interventions per flow sheet       []????????????  Discharge due to:_  []????????????  Other:Cristina Carreno, SPT 3/17/2021

## 2021-03-19 ENCOUNTER — HOSPITAL ENCOUNTER (OUTPATIENT)
Dept: PHYSICAL THERAPY | Age: 26
Discharge: HOME | End: 2021-03-19
Payer: COMMERCIAL

## 2021-03-19 PROCEDURE — 97140 MANUAL THERAPY 1/> REGIONS: CPT

## 2021-03-19 PROCEDURE — 97110 THERAPEUTIC EXERCISES: CPT

## 2021-03-24 ENCOUNTER — HOSPITAL ENCOUNTER (OUTPATIENT)
Dept: PHYSICAL THERAPY | Age: 26
Discharge: HOME | End: 2021-03-24
Payer: COMMERCIAL

## 2021-03-24 PROCEDURE — 97112 NEUROMUSCULAR REEDUCATION: CPT

## 2021-03-24 PROCEDURE — 97016 VASOPNEUMATIC DEVICE THERAPY: CPT

## 2021-03-24 PROCEDURE — 97110 THERAPEUTIC EXERCISES: CPT

## 2021-03-24 NOTE — PROGRESS NOTES
PT DAILY TREATMENT NOTE - [facility] 2-15    Patient Name: Karli Ochoa  Date:3/24/2021  : 1995  [x]  Patient  Verified  Payor: Merline Grow / Plan: Chalino Dooley / Product Type: HMO /    In time:7:45am  Out time:9:00am  Total Treatment Time (min): 75  Total Timed Codes (min): 60  1:1 Treatment Time ( only): 44  Visit #:  17    Treatment Area: Knee pain, right [M25.561]    SUBJECTIVE  Pain Level (0-10 scale): 0/10   Any medication changes, allergies to medications, adverse drug reactions, diagnosis change, or new procedure performed?: [x]?????????????? No    []?????????????? Yes (see summary sheet for update)  Subjective functional status/changes:   []?????????????? No changes reported  Pt reports his knee feels good today.        OBJECTIVE            Modality rationale: decrease edema, decrease inflammation and decrease pain to improve the patients ability to ambulate with decreased discomfort or pain    Min Type Additional Details        []? Estim: []? Att   []? Unatt    []? TENS instruct                  []?IFC  []? Premod   []? NMES                     []?Other:  []?w/US   []?w/ice   []?w/heat  Position:  Location:        []? Traction: []? Cervical       []? Lumbar                       []? Prone          []? Supine                       []?Intermittent   []? Continuous Lbs:  []? before manual  []? after manual  []?w/heat     []? Ultrasound: []? Continuous   []? Pulsed                       at: []?1MHz   []? 3MHz Location:  W/cm2:     []? Paraffin         Location:   []?w/heat   15 [x]? Ice     [x]? Heat  []? Ice massage Position: supine  Location:R knee     []? Laser  []? Other: Position:  Location:        []? Vasopneumatic Device Pressure:       []? lo []? med []? hi   Temperature:       [x]? Skin assessment post-treatment:  [x]? intact []? redness- no adverse reaction    []? redness  adverse reaction:       45 min Therapeutic Exercise:  [x]??????????????? See flow sheet:   Rationale: increase ROM, increase strength and increase proprioception to improve the patients ability to ambulate with decreased pain and improved mechanics.    15 min Neuromuscular Re-education:  [x]????????  See flow sheet : Rockerboard taps and hold, SLS NC lateral throws with rebounder, BOSU squats   Rationale: improve balance and increase proprioception  to improve the patients ability to ambulate with decreased discomfort and improved mechanics.                                                                      With   []?????????????? TE   []?????????????? TA   []?????????????? Neuro   []?????????????? SC   []?????????????? other: Patient Education: [x]?????????????? Review HEP     []?????????????? Progressed/Changed HEP based on:   []?????????????? positioning   []?????????????? body mechanics   []?????????????? transfers   [x]?????????????? heat/ice application    []?????????????? other:       Other Objective/Functional Measures:--     Pain Level (0-10 scale) post treatment: 0/10     ASSESSMENT/Changes in Function:   Pt challenged with advancements in SL and DL squats on TG. Added BOSU squats, which patient tolerated well with no reports of pain. Will continue to progress strengthening as tolerated and will assess SL squat 1 rep max next session. Patient will continue to benefit from skilled PT services to modify and progress therapeutic interventions, address functional mobility deficits, address ROM deficits, address strength deficits, analyze and address soft tissue restrictions, analyze and cue movement patterns, analyze and modify body mechanics/ergonomics, assess and modify postural abnormalities, address imbalance/dizziness and instruct in home and community integration to attain remaining goals.   Treatment was performed with direct supervision by Patricia Miramontes, PT, DPT.      []??????????????  See Plan of Care  [x]??????????????  See progress note/recertification  []??????????????  See Discharge Summary         Progress towards goals / Updated goals:  Short Term Goals: To be accomplished in 6 weeks:               1. Pt will be independent and compliant with HEP. -MET  2) Pt will demonstrate Knee extension >/= to 0 degrees ext to aid in ambulation -MET  3) Pt will be able to Ambulate with more normalized gait pattern on level terrain - MET  4) Pt will demonstrate Knee flexion >/= to 120 to aid in sitting  -MET  Long Term Goals: To be accomplished in 12 week:               1.  Pt will be independent and compliant with HEP. -MET               2. Pt will improve FOTO score by the MCID demonstrating improved overall function with decreased pain or discomfort.               3. Pt will be able to Navigate a flight of stairs without pain - MET  4) Pt will be able to Ambulate on uneven terrain without pain or instability -MET  5) Pt will be able to Ambulate >/= 1 mile without pain -MET  6) Pt will demonstrate Knee flexion >/= 120 degrees to aid in sitting/squatting -MET  7) Pt will be able to return to running >/=1 mile without pain. - Progressing  8) Pt will be able to squat to 90 degrees knee flexion with BW resistance without complaints of pain. - Progressing (pain during initial concentric portion.)     PLAN   [x]??????????????  Upgrade activities as tolerated     [x]??????????????  Continue plan of care  []??????????????  Update interventions per flow sheet       []??????????????  Discharge due to:_  []??????????????  Other:Cristina Carreno, SPT 3/24/2021

## 2021-03-26 ENCOUNTER — APPOINTMENT (OUTPATIENT)
Dept: PHYSICAL THERAPY | Age: 26
End: 2021-03-26
Payer: COMMERCIAL

## 2021-03-30 ENCOUNTER — HOSPITAL ENCOUNTER (OUTPATIENT)
Dept: PHYSICAL THERAPY | Age: 26
Discharge: HOME | End: 2021-03-30
Payer: COMMERCIAL

## 2021-03-30 PROCEDURE — 97110 THERAPEUTIC EXERCISES: CPT

## 2021-03-30 PROCEDURE — 97112 NEUROMUSCULAR REEDUCATION: CPT

## 2021-03-30 NOTE — PROGRESS NOTES
PT DAILY TREATMENT NOTE - [facility] 2-15    Patient Name: Micah Saldaña  Date:3/30/2021  : 1995  [x]  Patient  Verified  Payor: Eileen Krishnamurthy / Plan: Nick Crumbly / Product Type: HMO /    In time: 8:04A  Out time:9:18A  Total Treatment Time (min): 74  Total Timed Codes (min): 74  1:1 Treatment Time ( only): 41   Visit #:  18    Treatment Area: Knee pain, right [M25.561]    SUBJECTIVE  Pain Level (0-10 scale): 0/10   Any medication changes, allergies to medications, adverse drug reactions, diagnosis change, or new procedure performed?: [x]??????????????? No    []??????????????? Yes (see summary sheet for update)  Subjective functional status/changes:   []??????????????? No changes reported  Pt reported going hiking this weekend. Knee felt fine. Tried to be careful coming down the mountain. His knee felt a little weak but no pain.       OBJECTIVE         49 min Therapeutic Exercise:  [x]???????????????? See flow sheet:   Rationale: increase ROM, increase strength and increase proprioception to improve the patients ability to ambulate with decreased pain and improved mechanics.    25 min Neuromuscular Re-education:  [x]?????????  See flow sheet : Rockerboard taps and hold, SLS NC lateral throws with rebounder, BOSU squats   Rationale: improve balance and increase proprioception  to improve the patients ability to ambulate with decreased discomfort and improved mechanics.                                                                      With   []??????????????? TE   []??????????????? TA   []??????????????? Neuro   []??????????????? SC   []??????????????? other: Patient Education: [x]??????????????? Review HEP     []??????????????? Progressed/Changed HEP based on:   []??????????????? positioning   []??????????????? body mechanics   []??????????????? transfers   [x]??????????????? heat/ice application    []??????????????? other:       Other Objective/Functional Measures:--     Pain Level (0-10 scale) post treatment: 0/10     ASSESSMENT/Changes in Function:   Pt tolerated session well. Did correct form with lateral step ups today. Pt demonstrated increase in anterior translation of weight. Patient will continue to benefit from skilled PT services to modify and progress therapeutic interventions, address functional mobility deficits, address ROM deficits, address strength deficits, analyze and address soft tissue restrictions, analyze and cue movement patterns, analyze and modify body mechanics/ergonomics, assess and modify postural abnormalities, address imbalance/dizziness and instruct in home and community integration to attain remaining goals.    []???????????????  See Plan of Care  [x]???????????????  See progress note/recertification  []???????????????  See Discharge Summary         Progress towards goals / Updated goals:  Short Term Goals: To be accomplished in 6 weeks:               1. Pt will be independent and compliant with HEP. -MET  2) Pt will demonstrate Knee extension >/= to 0 degrees ext to aid in ambulation -MET  3) Pt will be able to Ambulate with more normalized gait pattern on level terrain - MET  4) Pt will demonstrate Knee flexion >/= to 120 to aid in sitting  -MET  Long Term Goals: To be accomplished in 12 week:               1.  Pt will be independent and compliant with HEP. -MET               2. Pt will improve FOTO score by the MCID demonstrating improved overall function with decreased pain or discomfort.               3. Pt will be able to Navigate a flight of stairs without pain - MET  4) Pt will be able to Ambulate on uneven terrain without pain or instability -MET  5) Pt will be able to Ambulate >/= 1 mile without pain -MET  6) Pt will demonstrate Knee flexion >/= 120 degrees to aid in sitting/squatting -MET  7) Pt will be able to return to running >/=1 mile without pain. - Progressing  8) Pt will be able to squat to 90 degrees knee flexion with BW resistance without complaints of pain. - Progressing (pain during initial concentric portion.)     PLAN   [x]???????????????  Upgrade activities as tolerated     [x]???????????????  Continue plan of care  []???????????????  Update interventions per flow sheet       []???????????????  Discharge due to:_  []???????????????  Other:_     Sofía Villatoro, PTA, [facility] 3/30/2021

## 2021-04-13 ENCOUNTER — HOSPITAL ENCOUNTER (OUTPATIENT)
Dept: PHYSICAL THERAPY | Age: 26
Discharge: HOME | End: 2021-04-13
Payer: COMMERCIAL

## 2021-04-13 PROCEDURE — 97112 NEUROMUSCULAR REEDUCATION: CPT

## 2021-04-13 PROCEDURE — 97110 THERAPEUTIC EXERCISES: CPT

## 2021-04-13 PROCEDURE — 97140 MANUAL THERAPY 1/> REGIONS: CPT

## 2021-04-13 NOTE — PROGRESS NOTES
PT DAILY TREATMENT NOTE - [facility] 2-15    Patient Name: Gilles Dodd  Date:2021  : 1995  [x]  Patient  Verified  Payor: Mihir Moraes / Plan: Marylen Hones / Product Type: HMO /    In time: 3:45P  Out time: 5:00P  Total Treatment Time (min): 75  Total Timed Codes (min): 65  1:1 Treatment Time ([address] only): 45   Visit #:  19    Treatment Area: Knee pain, right [M25.561]    SUBJECTIVE  Pain Level (0-10 scale): 0/10   Any medication changes, allergies to medications, adverse drug reactions, diagnosis change, or new procedure performed?: [x]??????????????? No    []??????????????? Yes (see summary sheet for update)  Subjective functional status/changes:   []??????????????? No changes reported  Pt reported having some pain with walking lunges and squats along patella.      OBJECTIVE         30 min Therapeutic Exercise:  [x]???????????????? See flow sheet: Passive R quad stretching. Rationale: increase ROM, increase strength and increase proprioception to improve the patients ability to ambulate with decreased pain and improved mechanics.    27 min Neuromuscular Re-education:  [x]?????????  See flow sheet : Rockerboard taps and hold, SLS NC lateral throws with rebounder, BOSU squats   Rationale: improve balance and increase proprioception  to improve the patients ability to ambulate with decreased discomfort and improved mechanics.     8 min Manual Therapy: IASTM using \"boomerang\" to R distal/laterl quad   Rationale: improve balance and increase proprioception  to improve the patients ability to ambulate with decreased discomfort and improved mechanics.                                                                       With   []??????????????? TE   []??????????????? TA   []??????????????? Neuro   []??????????????? SC   []??????????????? other: Patient Education: [x]??????????????? Review HEP     []??????????????? Progressed/Changed HEP based on:   []??????????????? positioning   []??????????????? body mechanics   []??????????????? transfers   [x]??????????????? heat/ice application    []??????????????? other:       Other Objective/Functional Measures:--     Pain Level (0-10 scale) post treatment: 0/10     ASSESSMENT/Changes in Function:   Noted improved lunging mechanics with proper glut activation using band to pull knee into valgus. Additionally, had pt in front of mirror for continued feedback once band was removed. Advised pt regarding doing walking lunges to hold for now and work with static lunging in mirror. Patient will continue to benefit from skilled PT services to modify and progress therapeutic interventions, address functional mobility deficits, address ROM deficits, address strength deficits, analyze and address soft tissue restrictions, analyze and cue movement patterns, analyze and modify body mechanics/ergonomics, assess and modify postural abnormalities, address imbalance/dizziness and instruct in home and community integration to attain remaining goals.    []???????????????  See Plan of Care  [x]???????????????  See progress note/recertification  []???????????????  See Discharge Summary         Progress towards goals / Updated goals:  Short Term Goals: To be accomplished in 6 weeks:               1. Pt will be independent and compliant with HEP. -MET  2) Pt will demonstrate Knee extension >/= to 0 degrees ext to aid in ambulation -MET  3) Pt will be able to Ambulate with more normalized gait pattern on level terrain - MET  4) Pt will demonstrate Knee flexion >/= to 120 to aid in sitting  -MET  Long Term Goals: To be accomplished in 12 week:               1.  Pt will be independent and compliant with HEP. -MET               2. Pt will improve FOTO score by the MCID demonstrating improved overall function with decreased pain or discomfort.               3. Pt will be able to Navigate a flight of stairs without pain - MET  4) Pt will be able to Ambulate on uneven terrain without pain or instability -MET  5) Pt will be able to Ambulate >/= 1 mile without pain -MET  6) Pt will demonstrate Knee flexion >/= 120 degrees to aid in sitting/squatting -MET  7) Pt will be able to return to running >/=1 mile without pain. - Progressing  8) Pt will be able to squat to 90 degrees knee flexion with BW resistance without complaints of pain. - Progressing (pain during initial concentric portion.)     PLAN   [x]???????????????  Upgrade activities as tolerated     [x]???????????????  Continue plan of care  []???????????????  Update interventions per flow sheet       []???????????????  Discharge due to:_  []???????????????  Other:_     Marcio Choe PTA, OPTA 4/13/2021

## 2021-04-20 ENCOUNTER — HOSPITAL ENCOUNTER (OUTPATIENT)
Dept: PHYSICAL THERAPY | Age: 26
Discharge: HOME | End: 2021-04-20
Payer: COMMERCIAL

## 2021-04-20 PROCEDURE — 97112 NEUROMUSCULAR REEDUCATION: CPT

## 2021-04-20 PROCEDURE — 97016 VASOPNEUMATIC DEVICE THERAPY: CPT

## 2021-04-20 PROCEDURE — 97110 THERAPEUTIC EXERCISES: CPT

## 2021-04-20 NOTE — PROGRESS NOTES
PT DAILY TREATMENT NOTE - [facility] 2-15    Patient Name: Trenton Marie  Date:2021  : 1995  [x]  Patient  Verified  Payor: James Velarde / Plan: Shyann Grier / Product Type: HMO /    In time: 8:54A Out time: 9:55A  Total Treatment Time (min): 61  Total Timed Codes (min): 46  1:1 Treatment Time ([address] only): 41  Visit #:  20    Treatment Area: Knee pain, right [M25.561]    SUBJECTIVE  Pain Level (0-10 scale): 0/10   Any medication changes, allergies to medications, adverse drug reactions, diagnosis change, or new procedure performed?: [x]??????????????? No    []??????????????? Yes (see summary sheet for update)  Subjective functional status/changes:   []??????????????? No changes reported  Pt reported feeling much better with his lunges.      OBJECTIVE         26 min Therapeutic Exercise:  [x]???????????????? See flow sheet: Passive R quad stretching. Rationale: increase ROM, increase strength and increase proprioception to improve the patients ability to ambulate with decreased pain and improved mechanics.    15 min Neuromuscular Re-education:  [x]?????????  See flow sheet : Rockerboard taps and hold, SLS NC behind the knee throws, BOSU squats   Rationale: improve balance and increase proprioception  to improve the patients ability to ambulate with decreased discomfort and improved mechanics.                                                                       With   []??????????????? TE   []??????????????? TA   []??????????????? Neuro   []??????????????? SC   []??????????????? other: Patient Education: [x]??????????????? Review HEP     []??????????????? Progressed/Changed HEP based on:   []??????????????? positioning   []??????????????? body mechanics   []??????????????? transfers   [x]??????????????? heat/ice application    []??????????????? other:       Other Objective/Functional Measures:--     Pain Level (0-10 scale) post treatment: 0/10     ASSESSMENT/Changes in Function:   Pt challenged with progression of balance work today. Noted improved mechanics with lunges today. Pt demonstrated good form with walking lunges. Advised pt he could do those at home but with no weight. Pt will FU with MD tomorrow and with PT on Friday. Patient will continue to benefit from skilled PT services to modify and progress therapeutic interventions, address functional mobility deficits, address ROM deficits, address strength deficits, analyze and address soft tissue restrictions, analyze and cue movement patterns, analyze and modify body mechanics/ergonomics, assess and modify postural abnormalities, address imbalance/dizziness and instruct in home and community integration to attain remaining goals.    []???????????????  See Plan of Care  [x]???????????????  See progress note/recertification  []???????????????  See Discharge Summary         Progress towards goals / Updated goals:  Short Term Goals: To be accomplished in 6 weeks:               1. Pt will be independent and compliant with HEP. -MET  2) Pt will demonstrate Knee extension >/= to 0 degrees ext to aid in ambulation -MET  3) Pt will be able to Ambulate with more normalized gait pattern on level terrain - MET  4) Pt will demonstrate Knee flexion >/= to 120 to aid in sitting  -MET  Long Term Goals: To be accomplished in 12 week:               1.  Pt will be independent and compliant with HEP. -MET               2. Pt will improve FOTO score by the MCID demonstrating improved overall function with decreased pain or discomfort.               3. Pt will be able to Navigate a flight of stairs without pain - MET  4) Pt will be able to Ambulate on uneven terrain without pain or instability -MET  5) Pt will be able to Ambulate >/= 1 mile without pain -MET  6) Pt will demonstrate Knee flexion >/= 120 degrees to aid in sitting/squatting -MET  7) Pt will be able to return to running >/=1 mile without pain. - Progressing  8) Pt will be able to squat to 90 degrees knee flexion with BW resistance without complaints of pain. - Progressing (pain during initial concentric portion.)     PLAN   [x]???????????????  Upgrade activities as tolerated     [x]???????????????  Continue plan of care  []???????????????  Update interventions per flow sheet       []???????????????  Discharge due to:_  []???????????????  Other:_     Boone Yin PTA, OPTA 4/20/2021

## 2021-04-21 NOTE — PROGRESS NOTES
Physical Therapy at [facility],   a part of [facility] [facility]  [address] [address], [address] [place] [address], [address] [place] [address]  Phone: [phone] Fax: [phone]    Progress Note    Name: Marine December   : 1995   MD: Danny Perera MD       Treatment Diagnosis: Knee pain, right [M25.561]  Start of Care: 21    Visits from Start of Care: 20  Missed Visits: 1    Summary of Care: Mr. Israel Camarena has been seen for 20 skilled physical therapy visits S/P R ACL-R 21. Pt is doing very well overall and demonstrates full symmetrical knee ROM without complaints of any discomfort. He is not experiencing any pain with ambulation, functional activities, or exercises at this point. Planning to perform a triple hop test and running readiness assessment on Friday to assess symmetry and strength of various integral muscle groups in order to potentially initiate a return-to-run program. Will advance with plyometric activities in order to facilitate Pt return to full work duties without restriction or pain and minimized risk of future injury. Thank you for this referral!    Assessment / Recommendations: Pt would benefit from continued therapy in order to return to running, plyometric activities, and full work duties without restriction. Knee ROM: 5/0/140    Short Term Goals: To be accomplished in 6 weeks:               1. Pt will be independent and compliant with HEP. -MET  2) Pt will demonstrate Knee extension >/= to 0 degrees ext to aid in ambulation -MET  3) Pt will be able to Ambulate with more normalized gait pattern on level terrain - MET  4) Pt will demonstrate Knee flexion >/= to 120 to aid in sitting  -MET  Long Term Goals: To be accomplished in 12 week:               1.  Pt will be independent and compliant with HEP. -MET               2. Pt will improve FOTO score by the MCID demonstrating improved overall function with decreased pain or discomfort.               3. Pt will be able to Navigate a flight of stairs without pain - MET  4) Pt will be able to Ambulate on uneven terrain without pain or instability -MET  5) Pt will be able to Ambulate >/= 1 mile without pain -MET  6) Pt will demonstrate Knee flexion >/= 120 degrees to aid in sitting/squatting -MET  7) Pt will be able to return to running >/=1 mile without pain. - Progressing  8) Pt will be able to squat to 90 degrees knee flexion with BW resistance without complaints of pain. - Progressing (pain during initial concentric portion.)    Jackie Perez, PT, DPT 4/21/2021

## 2021-04-23 ENCOUNTER — HOSPITAL ENCOUNTER (OUTPATIENT)
Dept: PHYSICAL THERAPY | Age: 26
Discharge: HOME | End: 2021-04-23
Payer: COMMERCIAL

## 2021-04-23 PROCEDURE — 97110 THERAPEUTIC EXERCISES: CPT

## 2021-04-23 PROCEDURE — 97530 THERAPEUTIC ACTIVITIES: CPT

## 2021-04-23 PROCEDURE — 97016 VASOPNEUMATIC DEVICE THERAPY: CPT

## 2021-04-23 NOTE — PROGRESS NOTES
PT DAILY TREATMENT NOTE - [facility] 2-15    Patient Name: Diego Bradshaw  Date:2021  : 1995  [x]  Patient  Verified  Payor: Elias De Leon / Plan: Amie Johnson / Product Type: HMO /    In time:11:45 am  Out time:12:48 pm  Total Treatment Time (min): 63  Total Timed Codes (min): 53  1:1 Treatment Time ([facility] only): 48   Visit #: 21    Treatment Area: Knee pain, right [M25.561]    SUBJECTIVE  Pain Level (0-10 scale): 0/10   Any medication changes, allergies to medications, adverse drug reactions, diagnosis change, or new procedure performed?: [x]???????????????? No    []???????????????? Yes (see summary sheet for update)  Subjective functional status/changes:   []???????????????? No changes reported  Pt reports he has been doing well and able to lunge without pain.     OBJECTIVE                Modality rationale: decrease edema, decrease inflammation and decrease pain to improve the patients ability to ambulate with decreased pain and more normalized mechanics.     Min Type Additional Details     []???????????? Estim: []?????????? ?? Att   []???????????? Unatt        []????????????TENS instruct                  []????????????IFC  []???????????? Premod   []????????????NMES                     []???????????? Other:  []????????????w/US   []????????????w/ice   []????????????w/heat  Position:    Location:      []????????????  Traction: []???????????? Cervical       []????????????Lumbar                       []???????????? Prone          []???????????? Supine                       []?????????? ? ? Intermittent   []???????????? Continuous Lbs:  []???????????? before manual  []???????????? after manual  []????????????w/heat     []????????????  Ultrasound: []???????????? Continuous   []???????????? Pulsed at:                           []????????????1MHz   []????????????3MHz Location:  W/cm2:     []???????????? Paraffin         Location:   []????????????w/heat     []????????????  Ice     []????????????  Heat  []????????????  Ice massage Position:  Location:     []????????????  Laser  []????????????  Other: Position:  Location:   10    [x]????????????  Vasopneumatic Device Pressure:       []???????????? lo []???????????? med [x]???????????? hi   Temperature: 34      [x]???????????? Skin assessment post-treatment:  [x]????????????intact []????????????redness- no adverse reaction    []????????????redness  adverse reaction:       28 min Therapeutic Exercise:  [x]????????????????? See flow sheet: Passive R quad stretching. Rationale: increase ROM, increase strength and increase proprioception to improve the patients ability to ambulate with decreased pain and improved mechanics. 25 min Therapeutic Activity:  []  See flow sheet : Running readiness assessment; Squat jumps; TRX side lunges; SL squats   Rationale: increase strength, improve coordination and increase proprioception  to improve the patients ability to return to running with decreased pain or risk of future injury.                                                  With   []???????????????? TE   []???????????????? TA   []???????????????? Neuro   []???????????????? SC   []???????????????? other: Patient Education: [x]???????????????? Review HEP     []???????????????? Progressed/Changed HEP based on:   []???????????????? positioning   []???????????????? body mechanics   []???????????????? transfers   [x]???????????????? heat/ice application    []???????????????? other:       Other Objective/Functional Measures:  Running readiness assessment:   Hopping 160 bpm 60\": PASSED  Wall sit: PASSED  SL Squats 80 bpm 30\": FAILED L: mild genu valgus R: mild genu valgus, decreased knee control most notably after 20\"  Stairs 160 bpm 60\": PASSED  Plank hold 60\": PASSED     Pain Level (0-10 scale) post treatment: 0/10     ASSESSMENT/Changes in Function:   Performed running readiness assessment today to plan return to run;  Pt performed well, though demonstrated quad fatigue with wall sits and decreased knee stability with SL squats, particularly after 20 seconds; integrated into HEP with valgus-directed band resistance for SL squats. Initiated squat jumps with cueing for jumping and landing mechanics; Pt demonstrated good control and symmetry with motion. Patient will continue to benefit from skilled PT services to modify and progress therapeutic interventions, address functional mobility deficits, address ROM deficits, address strength deficits, analyze and address soft tissue restrictions, analyze and cue movement patterns, analyze and modify body mechanics/ergonomics, assess and modify postural abnormalities, address imbalance/dizziness and instruct in home and community integration to attain remaining goals.     []????????????????  See Plan of Care  [x]????????????????  See progress note/recertification  []????????????????  See Discharge Summary         Progress towards goals / Updated goals:  Short Term Goals: To be accomplished in 6 weeks:               1. Pt will be independent and compliant with HEP. -MET  2) Pt will demonstrate Knee extension >/= to 0 degrees ext to aid in ambulation -MET  3) Pt will be able to Ambulate with more normalized gait pattern on level terrain - MET  4) Pt will demonstrate Knee flexion >/= to 120 to aid in sitting  -MET  Long Term Goals: To be accomplished in 12 week:               1.  Pt will be independent and compliant with HEP. -MET               2. Pt will improve FOTO score by the MCID demonstrating improved overall function with decreased pain or discomfort.               3. Pt will be able to Navigate a flight of stairs without pain - MET  4) Pt will be able to Ambulate on uneven terrain without pain or instability -MET  5) Pt will be able to Ambulate >/= 1 mile without pain -MET  6) Pt will demonstrate Knee flexion >/= 120 degrees to aid in sitting/squatting -MET  7) Pt will be able to return to running >/=1 mile without pain. - Progressing  8) Pt will be able to squat to 90 degrees knee flexion with BW resistance without complaints of pain. - Progressing (pain during initial concentric portion.)     PLAN   [x]????????????????  Upgrade activities as tolerated     [x]????????????????  Continue plan of care  []????????????????  Update interventions per flow sheet       []????????????????  Discharge due to:_  []????????????????  Other:Cristina Perez, PT, DPT 4/23/2021

## 2021-04-28 ENCOUNTER — HOSPITAL ENCOUNTER (OUTPATIENT)
Dept: PHYSICAL THERAPY | Age: 26
Discharge: HOME | End: 2021-04-28
Payer: COMMERCIAL

## 2021-04-28 PROCEDURE — 97016 VASOPNEUMATIC DEVICE THERAPY: CPT

## 2021-04-28 PROCEDURE — 97110 THERAPEUTIC EXERCISES: CPT

## 2021-04-28 PROCEDURE — 97112 NEUROMUSCULAR REEDUCATION: CPT

## 2021-05-05 ENCOUNTER — HOSPITAL ENCOUNTER (OUTPATIENT)
Dept: PHYSICAL THERAPY | Age: 26
Discharge: HOME | End: 2021-05-05
Payer: COMMERCIAL

## 2021-05-05 PROCEDURE — 97110 THERAPEUTIC EXERCISES: CPT

## 2021-05-05 PROCEDURE — 97016 VASOPNEUMATIC DEVICE THERAPY: CPT

## 2021-05-05 PROCEDURE — 97112 NEUROMUSCULAR REEDUCATION: CPT

## 2021-05-05 PROCEDURE — 97530 THERAPEUTIC ACTIVITIES: CPT

## 2021-05-05 NOTE — PROGRESS NOTES
PT DAILY TREATMENT NOTE - [facility] 2-15    Patient Name: Noni Galarza  Date:2021  : 1995  [x]  Patient  Verified  Payor: Sonny Calvo / Plan: Mesfin Knowles / Product Type: HMO /    In time:7:51 am  Out time:8:54 am  Total Treatment Time (min): 63  Total Timed Codes (min): 48  1:1 Treatment Time ( [address] only): 43  Visit #: 23    Treatment Area: Knee pain, right [M25.561]    SUBJECTIVE  Pain Level (0-10 scale): 0/10   Any medication changes, allergies to medications, adverse drug reactions, diagnosis change, or new procedure performed?: [x]?????????????????? No    []?????????????????? Yes (see summary sheet for update)  Subjective functional status/changes:   []?????????????????? No changes reported  Pt reports his knee has been good, no issues. Diligent with his HEP.       OBJECTIVE                  Modality rationale: decrease edema, decrease inflammation and decrease pain to improve the patients ability to ambulate with decreased pain and more normalized mechanics.     Min Type Additional Details     []?????????????? Estim: []???????????? ?? Att   []?????????????? Unatt        []??????????????TENS instruct                  []??????????????IFC  []?????????????? Premod   []??????????????NMES                     []?????????????? Other:  []??????????????w/US   []??????????????w/ice   []??????????????w/heat  Position:    Location:      []??????????????  Traction: []?????????????? Cervical       []??????????????Lumbar                       []?????????????? Prone          []?????????????? Supine                       []???????????? ? ? Intermittent   []?????????????? Continuous Lbs:  []?????????????? before manual  []?????????????? after manual  []??????????????w/heat     []??????????????  Ultrasound: []?????????????? Continuous   []?????????????? Pulsed at:                           []??????????????1MHz   []??????????????3MHz Location:  W/cm2:     []?????????????? Paraffin         Location:   []??????????????w/heat     []??????????????  Ice     []??????????????  Heat  []??????????????  Ice massage Position:  Location:     []??????????????  Laser  []??????????????  Other: Position:  Location:   15    [x]??????????????  Vasopneumatic Device Pressure:       []?????????????? lo []?????????????? med [x]?????????????? hi   Temperature: 34      [x]?????????????? Skin assessment post-treatment:  [x]??????????????intact []??????????????redness- no adverse reaction    []??????????????redness  adverse reaction:        17 min Therapeutic Exercise:  [x]??????????????????? See flow sheet:    Rationale: increase ROM, increase strength and increase proprioception to improve the patients ability to ambulate with decreased pain and improved mechanics.    22 min Therapeutic Activity:  [x]? ?  See flow sheet :  Squat jumps; TRX side lunges; SL squats; lunge walk   Rationale: increase strength, improve coordination and increase proprioception  to improve the patients ability to return to running with decreased pain or risk of future injury. 9 min Neuromuscular Re-education:  []  See flow sheet : Squat jumps with band resistance; clocks on foam   Rationale: increase strength and improve balance  to improve the patients ability to return to PLOF with decreased pain or risk of future injury.                                                      With   []?????????????????? TE   []?????????????????? TA   []?????????????????? Neuro   []?????????????????? SC   []?????????????????? other: Patient Education: [x]?????????????????? Review HEP     []?????????????????? Progressed/Changed HEP based on:   []?????????????????? positioning   []?????????????????? body mechanics   []?????????????????? transfers   [x]?????????????????? heat/ice application    []?????????????????? other:       Other Objective/Functional Measures: --     Pain Level (0-10 scale) post treatment: 0/10     ASSESSMENT/Changes in Function:   Introduced lunge walk with corwin solorzano with cueing for proper knee control and technique; Pt performed well without any valgus or pain. Noted slight left deviation with squat jumps; utilized band resistance pulling in left direction in order to facilitate improved form and decreased shift and Pt performed correctly. Patient will continue to benefit from skilled PT services to modify and progress therapeutic interventions, address functional mobility deficits, address ROM deficits, address strength deficits, analyze and address soft tissue restrictions, analyze and cue movement patterns, analyze and modify body mechanics/ergonomics, assess and modify postural abnormalities, address imbalance/dizziness and instruct in home and community integration to attain remaining goals.     []??????????????????  See Plan of Care  [x]??????????????????  See progress note/recertification  []??????????????????  See Discharge Summary         Progress towards goals / Updated goals:  Short Term Goals: To be accomplished in 6 weeks:               1. Pt will be independent and compliant with HEP. -MET  2) Pt will demonstrate Knee extension >/= to 0 degrees ext to aid in ambulation -MET  3) Pt will be able to Ambulate with more normalized gait pattern on level terrain - MET  4) Pt will demonstrate Knee flexion >/= to 120 to aid in sitting  -MET  Long Term Goals: To be accomplished in 12 week:               1.  Pt will be independent and compliant with HEP. -MET               2. Pt will improve FOTO score by the MCID demonstrating improved overall function with decreased pain or discomfort.               3. Pt will be able to Navigate a flight of stairs without pain - MET  4) Pt will be able to Ambulate on uneven terrain without pain or instability -MET  5) Pt will be able to Ambulate >/= 1 mile without pain -MET  6) Pt will demonstrate Knee flexion >/= 120 degrees to aid in sitting/squatting -MET  7) Pt will be able to return to running >/=1 mile without pain. - Progressing  8) Pt will be able to squat to 90 degrees knee flexion with BW resistance without complaints of pain. - Progressing (pain during initial concentric portion.)     PLAN   [x]??????????????????  Upgrade activities as tolerated     [x]??????????????????  Continue plan of care  []??????????????????  Update interventions per flow sheet       []??????????????????  Discharge due to:_  []??????????????????  Other:Cristina Ac, PT, DPT 5/5/2021

## 2021-05-18 ENCOUNTER — APPOINTMENT (OUTPATIENT)
Dept: PHYSICAL THERAPY | Age: 26
End: 2021-05-18
Payer: COMMERCIAL

## 2021-05-19 ENCOUNTER — HOSPITAL ENCOUNTER (OUTPATIENT)
Dept: PHYSICAL THERAPY | Age: 26
Discharge: HOME | End: 2021-05-19
Payer: COMMERCIAL

## 2021-05-19 PROCEDURE — 97016 VASOPNEUMATIC DEVICE THERAPY: CPT

## 2021-05-19 PROCEDURE — 97112 NEUROMUSCULAR REEDUCATION: CPT

## 2021-05-19 PROCEDURE — 97110 THERAPEUTIC EXERCISES: CPT

## 2021-05-19 PROCEDURE — 97530 THERAPEUTIC ACTIVITIES: CPT

## 2021-05-19 NOTE — PROGRESS NOTES
PT DAILY TREATMENT NOTE - [facility] 2-15    Patient Name: Diego Bradshaw  Date:2021  : 1995  [x]  Patient  Verified  Payor: Elias De Leon / Plan: Amie Johnson / Product Type: HMO /    In time:7:45A  Out time:9:05A  Total Treatment Time (min): 80  Total Timed Codes (min): 65  1:1 Treatment Time ( [address] only): 62  Visit #:  24    Treatment Area: Knee pain, right [M25.561]    SUBJECTIVE  Pain Level (0-10 scale): 0/10  Any medication changes, allergies to medications, adverse drug reactions, diagnosis change, or new procedure performed?: [x] No    [] Yes (see summary sheet for update)  Subjective functional status/changes:   [] No changes reported  Patient said he is feeling good. He said at first impact for certain exercises he feels pain, like lunges or jumps, but then he feels better. The Student Physical Therapist Assistant participated in the delivery of services under the direction of Lev Hill OPTA; directing the service, making the skilled judgment, and who is responsible for the supervision of treatment. OBJECTIVE    Modality rationale: decrease inflammation and decrease pain to improve the patients ability to decrease pain with jumping.     Min Type Additional Details       [] Estim: []Att   []Unatt    []TENS instruct                  []IFC  []Premod   []NMES                     []Other:  []w/US   []w/ice   []w/heat  Position:  Location:       []  Traction: [] Cervical       []Lumbar                       [] Prone          []Supine                       []Intermittent   []Continuous Lbs:  [] before manual  [] after manual  []w/heat    []  Ultrasound: []Continuous   [] Pulsed                       at: []1MHz   []3MHz Location:  W/cm2:    [] Paraffin         Location:   []w/heat    []  Ice     []  Heat  []  Ice massage Position:  Location:    []  Laser  []  Other: Position:  Location:   15   [x]  Vasopneumatic Device Pressure:       [] lo [x] med [] hi   Temperature: 34     [x] Skin assessment post-treatment:  [x]intact []redness- no adverse reaction    []redness - adverse reaction:     31 min Therapeutic Exercise:  [x] See flow sheet :   Rationale: increase ROM, increase strength, improve coordination, improve balance and increase proprioception to improve the patients ability to increase function and strength of LE.    21 min Therapeutic Activity:  [x]  See flow sheet : SL to DL jumps, DL to SL jumps,  squat jumps, lunge walk   Rationale: increase strength, improve coordination, improve balance and increase proprioception  to improve the patients ability to return to running safely. 13 min Neuromuscular Re-education:  [x]  See flow sheet :clocks on foam, SLS behind the knee at the rebounder   Rationale: increase strength, improve coordination, improve balance and increase proprioception  to improve the patients ability to increase glut activation and quad stability. With   [] TE   [] TA   [] Neuro   [] SC   [] other: Patient Education: [x] Review HEP    [] Progressed/Changed HEP based on:   [] positioning   [] body mechanics   [] transfers   [] heat/ice application    [] other:      Other Objective/Functional Measures: AROM Knee Flexion: 140 Knee Extension:0     Pain Level (0-10 scale) post treatment: 0/10    ASSESSMENT/Changes in Function:   Patient tolerated treatment well today. Continued working on glut activation, core stability, and quad strength with proper body mechanics. Added SLS with ball behind knee at rebounder to continue to work on hip and knee stability. Patient reported he is seeing his ortho tomorrow so knee flexion and extension checked. Informed evaluating therapist regarding progress.   Patient will continue to benefit from skilled PT services to modify and progress therapeutic interventions, address functional mobility deficits, address ROM deficits, address strength deficits, analyze and address soft tissue restrictions, analyze and cue movement patterns, analyze and modify body mechanics/ergonomics and assess and modify postural abnormalities to attain remaining goals. []  See Plan of Care  []  See progress note/recertification  []  See Discharge Summary         Progress towards goals / Updated goals:  Short Term Goals: To be accomplished in 6 weeks:               1. Pt will be independent and compliant with HEP. -MET  2) Pt will demonstrate Knee extension >/= to 0 degrees ext to aid in ambulation -MET  3) Pt will be able to Ambulate with more normalized gait pattern on level terrain - MET  4) Pt will demonstrate Knee flexion >/= to 120 to aid in sitting  -MET  Long Term Goals: To be accomplished in 12 week:               1. Pt will be independent and compliant with HEP. -MET               2. Pt will improve FOTO score by the MCID demonstrating improved overall function with decreased pain or discomfort. 3. Pt will be able to Navigate a flight of stairs without pain - MET  4) Pt will be able to Ambulate on uneven terrain without pain or instability -MET  5) Pt will be able to Ambulate >/= 1 mile without pain -MET  6) Pt will demonstrate Knee flexion >/= 120 degrees to aid in sitting/squatting -MET  7) Pt will be able to return to running >/=1 mile without pain. - Progressing  8) Pt will be able to squat to 90 degrees knee flexion with BW resistance without complaints of pain.  - Progressing (pain during initial concentric portion.)    PLAN  [x]  Upgrade activities as tolerated     [x]  Continue plan of care  []  Update interventions per flow sheet       []  Discharge due to:_  []  Other:_      CANDIE Erickson 5/19/2021

## 2021-05-19 NOTE — PROGRESS NOTES
[facility]   a part of [address] [place] [address], [address] [place] [address], [address] [place] [address]  Phone: [phone] Fax: [phone]    Progress Note    Name: Ronan Garzon   : 1995   MD: Loraine Swenson MD       Treatment Diagnosis: Knee pain, right [M25.561]  Start of Care: 21    Visits from Start of Care: 24  Missed Visits: 0    Summary of Care: Mr. Marcy Conway has been seen for 24 skilled physical therapy visits S/P R ACL-R 21. Pt continues to progress well as he improves lower extremity strength, stability, and proprioception. Performed a running readiness assessment targeting various associated muscle groups and he passed four of five tests demonstrated good quad and core strength; Pt failed at maintaining good knee control with single leg squats at faster pace for a 30 second time interval. Thus, emphasizing improved hip/knee control and stamina as he begins to return to running to avoid future injury. Pt is tolerating jumping/landing well and is performing double to single and single to double leg with good control. Will continue to progress and challenge dynamic/plyometric activity and guide Pt through return to run progression as appropriate. Thank you for this referral!     Assessment / Recommendations: Will continue to progress and challenge dynamic/plyometric activity and guide Pt through return to run progression as appropriate. Short Term Goals: To be accomplished in 6 weeks:               1. Pt will be independent and compliant with HEP. -MET  2) Pt will demonstrate Knee extension >/= to 0 degrees ext to aid in ambulation -MET  3) Pt will be able to Ambulate with more normalized gait pattern on level terrain - MET  4) Pt will demonstrate Knee flexion >/= to 120 to aid in sitting  -MET  Long Term Goals: To be accomplished in 12 week:               1.  Pt will be independent and compliant with HEP. -MET               2. Pt will improve FOTO score by the MCID demonstrating improved overall function with decreased pain or discomfort.               3. Pt will be able to Navigate a flight of stairs without pain - MET  4) Pt will be able to Ambulate on uneven terrain without pain or instability -MET  5) Pt will be able to Ambulate >/= 1 mile without pain -MET  6) Pt will demonstrate Knee flexion >/= 120 degrees to aid in sitting/squatting -MET  7) Pt will be able to return to running >/=1 mile without pain. - Progressing  8) Pt will be able to squat to 90 degrees knee flexion with BW resistance without complaints of pain. - MET    Jose Alfredo Forte, PT, DPT 5/19/2021

## 2021-05-26 ENCOUNTER — HOSPITAL ENCOUNTER (OUTPATIENT)
Dept: PHYSICAL THERAPY | Age: 26
Discharge: HOME | End: 2021-05-26
Payer: COMMERCIAL

## 2021-05-26 PROCEDURE — 97530 THERAPEUTIC ACTIVITIES: CPT

## 2021-05-26 PROCEDURE — 97110 THERAPEUTIC EXERCISES: CPT

## 2021-05-26 PROCEDURE — 97112 NEUROMUSCULAR REEDUCATION: CPT

## 2021-05-26 NOTE — PROGRESS NOTES
PT DAILY TREATMENT NOTE - [facility] 2-15    Patient Name: Zulma Saul  Date:2021  : 1995  [x]  Patient  Verified  Payor: Raine Robertson / Plan: Lilo Murdock / Product Type: HMO /    In time: 7:50 am  Out time: 8:55 am  Total Treatment Time (min): 65  Total Timed Codes (min): 65  1:1 Treatment Time ([facility] only): 55   Visit #: 25    Treatment Area: Knee pain, right [M25.561]    SUBJECTIVE  Pain Level (0-10 scale): 0/10  Any medication changes, allergies to medications, adverse drug reactions, diagnosis change, or new procedure performed?: [x]? No    []? Yes (see summary sheet for update)  Subjective functional status/changes:   []? No changes reported  Patient reports he went for a 2.5 mile jog in intervals; mild discomfort though felt okay overall. Going slightly downhill was most challenging. No issues with exercises/daily activities. OBJECTIVE     18 min Therapeutic Exercise:  [x]? See flow sheet :   Rationale: increase ROM, increase strength, improve coordination, improve balance and increase proprioception to improve the patients ability to increase function and strength of LE.     37 min Therapeutic Activity:  [x]? See flow sheet :SL jump downs; SL jump downs with lateral jump; sled push; agility ladder; jogging intervals   Rationale: increase strength, improve coordination, improve balance and increase proprioception  to improve the patients ability to return to running safely. 10 min Neuromuscular Re-education:  [x]? See flow sheet :Rebounder on BOSU; BOSU step-throughs; clocks on foam   Rationale: increase strength, improve coordination, improve balance and increase proprioception  to improve the patients ability to increase glut activation and quad stability. With   []? TE   []? TA   []? Neuro   []? SC   []? other: Patient Education: [x]? Review HEP    []? Progressed/Changed HEP based on:   []? positioning   []?  body mechanics   []? transfers   []? heat/ice application    []? other:       Other Objective/Functional Measures:   AROM Knee Flexion: 140 Knee Extension:0        Running Melody: 164     Pain Level (0-10 scale) post treatment: 0/10     ASSESSMENT/Changes in Function:   Initiated more advanced dynamic activity today with agility ladder, interval jogging on treadmill, and jumping progressions. Pt tolerated and performed well with cueing for mechanics, technique, and stabilization as needed. Patient will continue to benefit from skilled PT services to modify and progress therapeutic interventions, address functional mobility deficits, address ROM deficits, address strength deficits, analyze and address soft tissue restrictions, analyze and cue movement patterns, analyze and modify body mechanics/ergonomics and assess and modify postural abnormalities to attain remaining goals. []? See Plan of Care  []? See progress note/recertification  []? See Discharge Summary         Progress towards goals / Updated goals:  Short Term Goals: To be accomplished in 6 weeks:               1. Pt will be independent and compliant with HEP. -MET  2) Pt will demonstrate Knee extension >/= to 0 degrees ext to aid in ambulation -MET  3) Pt will be able to Ambulate with more normalized gait pattern on level terrain - MET  4) Pt will demonstrate Knee flexion >/= to 120 to aid in sitting  -MET  Long Term Goals: To be accomplished in 12 week:               1.  Pt will be independent and compliant with HEP. -MET               2. Pt will improve FOTO score by the MCID demonstrating improved overall function with decreased pain or discomfort.               3. Pt will be able to Navigate a flight of stairs without pain - MET  4) Pt will be able to Ambulate on uneven terrain without pain or instability -MET  5) Pt will be able to Ambulate >/= 1 mile without pain -MET  6) Pt will demonstrate Knee flexion >/= 120 degrees to aid in sitting/squatting -MET  7) Pt will be able to return to running >/=1 mile without pain. - Progressing  8) Pt will be able to squat to 90 degrees knee flexion with BW resistance without complaints of pain. - Progressing (pain during initial concentric portion.)     PLAN  [x]? Upgrade activities as tolerated     [x]? Continue plan of care  []? Update interventions per flow sheet       []? Discharge due to:_  []?   Other:_      Edie Mack, PT, DPT 5/26/2021

## 2021-06-02 ENCOUNTER — HOSPITAL ENCOUNTER (OUTPATIENT)
Dept: PHYSICAL THERAPY | Age: 26
Discharge: HOME | End: 2021-06-02
Payer: COMMERCIAL

## 2021-06-02 PROCEDURE — 97530 THERAPEUTIC ACTIVITIES: CPT

## 2021-06-02 PROCEDURE — 97112 NEUROMUSCULAR REEDUCATION: CPT

## 2021-06-02 PROCEDURE — 97110 THERAPEUTIC EXERCISES: CPT

## 2021-06-02 NOTE — PROGRESS NOTES
PT DAILY TREATMENT NOTE - [facility] 2-15    Patient Name: Ajay Trinidad  Date:2021  : 1995  [x]  Patient  Verified  Payor: Maggie Cons / Plan: Edwar Langford / Product Type: HMO /    In time: 12:52 pm  Out time: 1:54 pm  Total Treatment Time (min): 62  Total Timed Codes (min): 62  1:1 Treatment Time ( [address] only): 64  Visit #: 26    Treatment Area: Knee pain, right [M25.561]    SUBJECTIVE  Pain Level (0-10 scale): 0/10  Any medication changes, allergies to medications, adverse drug reactions, diagnosis change, or new procedure performed?: [x]? No    []? Yes (see summary sheet for update)  Subjective functional status/changes:   []? No changes reported  Patient reports his knee is doing well; no issues after last session. He does note some left-sided low back tightness with running (running about a 9' pace); feels like it's due to asymmetry in strength/mechanics. OBJECTIVE     21 min Therapeutic Exercise:  [x]? See flow sheet :   Rationale: increase ROM, increase strength, improve coordination, improve balance and increase proprioception to improve the patients ability to increase function and strength of LE.     15 min Therapeutic Activity:  [x]? See flow sheet : SL hop tests (single, triple, 6m), dynamic warm-up,    Rationale: increase strength, improve coordination, improve balance and increase proprioception  to improve the patients ability to return to running safely. 26 min Neuromuscular Re-education:  [x]? See flow sheet :Rebounder on Rockerboard; BOSU step-throughs; SL Squats with band resistance; SWB runner   Rationale: increase strength, improve coordination, improve balance and increase proprioception  to improve the patients ability to increase glut activation and quad stability. With   []? TE   []? TA   []? Neuro   []? SC   []? other: Patient Education: [x]? Review HEP    []?  Progressed/Changed HEP based on:   []? positioning   []? body mechanics   []? transfers   []? heat/ice application    []? other:       Other Objective/Functional Measures:   AROM Knee Flexion: 140 Knee Extension:0        Running Melody: 164     Single hop test (in):   R: 43, 44, 45 (44 avg)  L: 57, 56, 60 (58 avg)  % difference: 75.8%     Triple hop test (ft, in):   R: 15.1, 15.11, 16.6 --> (15.6 avg)  L: 18.6,19.3, 19.5 --> (19.1 avg)  % difference: 81.5%    6 m hop test:   R: 2.50 seconds  L: 1.93 seconds  % difference:77%       Pain Level (0-10 scale) post treatment: 0/10     ASSESSMENT/Changes in Function:   Performed single hop, triple hop, and 6 meter hop test today; Pt demonstrated good control and landing mechanics though measured about 75-80% of contralateral side with each test. Emphasized minimizing CL hip drop with SL squats via improved hip activation/neuro re-ed utilizing band resistance and SWB; Pt able to correct and performed in limited ROM with good control. Patient will continue to benefit from skilled PT services to modify and progress therapeutic interventions, address functional mobility deficits, address ROM deficits, address strength deficits, analyze and address soft tissue restrictions, analyze and cue movement patterns, analyze and modify body mechanics/ergonomics and assess and modify postural abnormalities to attain remaining goals. []? See Plan of Care  []? See progress note/recertification  []? See Discharge Summary         Progress towards goals / Updated goals:  Short Term Goals: To be accomplished in 6 weeks:               1. Pt will be independent and compliant with HEP. -MET  2) Pt will demonstrate Knee extension >/= to 0 degrees ext to aid in ambulation -MET  3) Pt will be able to Ambulate with more normalized gait pattern on level terrain - MET  4) Pt will demonstrate Knee flexion >/= to 120 to aid in sitting  -MET  Long Term Goals: To be accomplished in 12 week:               1.  Pt will be independent and compliant with HEP. -MET               2. Pt will improve FOTO score by the MCID demonstrating improved overall function with decreased pain or discomfort.               3. Pt will be able to Navigate a flight of stairs without pain - MET  4) Pt will be able to Ambulate on uneven terrain without pain or instability -MET  5) Pt will be able to Ambulate >/= 1 mile without pain -MET  6) Pt will demonstrate Knee flexion >/= 120 degrees to aid in sitting/squatting -MET  7) Pt will be able to return to running >/=1 mile without pain. - Progressing  8) Pt will be able to squat to 90 degrees knee flexion with BW resistance without complaints of pain. - Progressing (pain during initial concentric portion.)     PLAN  [x]? Upgrade activities as tolerated     [x]? Continue plan of care  []? Update interventions per flow sheet       []? Discharge due to:_  []?   Other:_      Jane Michael, PT, DPT 6/2/2021

## 2021-06-16 ENCOUNTER — HOSPITAL ENCOUNTER (OUTPATIENT)
Dept: PHYSICAL THERAPY | Age: 26
End: 2021-06-16
Payer: COMMERCIAL

## 2021-06-21 ENCOUNTER — HOSPITAL ENCOUNTER (OUTPATIENT)
Dept: PHYSICAL THERAPY | Age: 26
Discharge: HOME | End: 2021-06-21
Payer: COMMERCIAL

## 2021-06-21 PROCEDURE — 97110 THERAPEUTIC EXERCISES: CPT

## 2021-06-21 PROCEDURE — 97530 THERAPEUTIC ACTIVITIES: CPT

## 2021-06-21 NOTE — PROGRESS NOTES
PT DAILY TREATMENT NOTE - [facility] 2-15    Patient Name: Nicholas Alberto  Date:2021  : 1995  [x]  Patient  Verified  Payor: Tomasz Parker / Plan: Nicole Royalty / Product Type: HMO /    In time: 7:04 am  Out time:8:07 am  Total Treatment Time (min): 63  Total Timed Codes (min): 56  1:1 Treatment Time ( [address] only): 56  Visit #: 27    Treatment Area: Knee pain, right [M25.561]    SUBJECTIVE  Pain Level (0-10 scale): 0/10  Any medication changes, allergies to medications, adverse drug reactions, diagnosis change, or new procedure performed?: [x]? No    []? Yes (see summary sheet for update)  Subjective functional status/changes:   []? No changes reported  Patient reports no issues with his knee. He has been running up to 2 miles on the treadmill without pain, and has been squatting 185# at the gym. OBJECTIVE     45 min Therapeutic Exercise:  [x]? See flow sheet :   Rationale: increase ROM, increase strength, improve coordination, improve balance and increase proprioception to improve the patients ability to increase function and strength of LE.     18 min Therapeutic Activity:  [x]? See flow sheet : SL hop tests (single, triple, 6m), dynamic warm-up, running on treadmill   Rationale: increase strength, improve coordination, improve balance and increase proprioception  to improve the patients ability to return to running safely. With   []? TE   []? TA   []? Neuro   []? SC   []? other: Patient Education: [x]? Review HEP    []? Progressed/Changed HEP based on:   []? positioning   []? body mechanics   []? transfers   []? heat/ice application    []?  other:       Other Objective/Functional Measures:   AROM Knee Flexion: 140 Knee Extension:0        Running Melody: 164     Single hop test (cm):   R: 124, 132, 138 (131.3 avg)  L: 147, 174, 166 (162.3 avg)  % of unaffected: 81%     Triple hop test (ft, in):           R: 17.1, 17.0, 17.4 (17.2 avg)          L: 19.10, 19.8,  19.6  (19.8 avg)  % of unaffected: 87%    6 m hop test:   R: 1.89 seconds   L: 1.76 seconds   % of unaffected: 93%      Pain Level (0-10 scale) post treatment: 0/10     ASSESSMENT/Changes in Function:      []? See Plan of Care  [x]? See progress note/recertification  []? See Discharge Summary         Progress towards goals / Updated goals:  Short Term Goals: To be accomplished in 6 weeks:               1. Pt will be independent and compliant with HEP. -MET  2) Pt will demonstrate Knee extension >/= to 0 degrees ext to aid in ambulation -MET  3) Pt will be able to Ambulate with more normalized gait pattern on level terrain - MET  4) Pt will demonstrate Knee flexion >/= to 120 to aid in sitting  -MET  Long Term Goals: To be accomplished in 12 week:               1. Pt will be independent and compliant with HEP. -MET               2. Pt will improve FOTO score by the MCID demonstrating improved overall function with decreased pain or discomfort.                3. Pt will be able to Navigate a flight of stairs without pain - MET  4) Pt will be able to Ambulate on uneven terrain without pain or instability -MET  5) Pt will be able to Ambulate >/= 1 mile without pain -MET  6) Pt will demonstrate Knee flexion >/= 120 degrees to aid in sitting/squatting -MET  7) Pt will be able to return to running >/=1 mile without pain. - MET (2 miles on treadmill)  8) Pt will be able to squat to 90 degrees knee flexion with BW resistance without complaints of pain. - MET  9) Pt will demonstrate >/= 95% of contralateral limb with single and triple SL hop test in order to minimize risk of future injury. - Progressing  10) Pt will demonstrate >/= 95% of unaffected limb with 6 m hop test - Progressing  11) Pt will be able to perform lateral cutting movements without pain or instability. - Progressing  12) Pt will be able to demonstrate rotational control with jumping movements >/= to contralateral limb. - Progressing       PLAN  [x]?   Upgrade activities as tolerated [x]?  Continue plan of care  []? Update interventions per flow sheet       []? Discharge due to:_  []?   Other:_      Augusto Ritter PT, DPT 6/21/2021

## 2021-06-21 NOTE — PROGRESS NOTES
Physical Therapy at [facility],   a part of [address]  [address] [address], [address] [place] [address], [facility]  Phone: [phone] Fax: [phone]    Progress Note    Name: Gilles Dodd   : 1995   MD: Ansley Tai MD       Treatment Diagnosis: Knee pain, right [M25.561]  Start of Care: 21    Visits from Start of Care: 27  Missed Visits: 0    Summary of Care: Mr. Sirena Akers has been seen for 27 skilled physical therapy visits S/P R ACL-R 21. Pt is doing very well, and is running up to 2 miles on the treadmill with good mechanics and without any knee pain. He is continuing to demonstrate improved right lower extremity strength, though still asymmetry compared to unaffected leg. He has tolerated plyometric activity and progressions well within clinic, demonstrating good knee control with jumping and landing. Upon assessment of single leg single hop test, triple hop test, and 6 m hop test, Pt affected leg is 81%, 87%, and 93% of unaffected leg scores; aiming for >/= 95% upon discharge to minimize risk of future injury. Pt would benefit from continued therapy to further minimize asymmetry in lower extremity strength and plyometric performance, as well as continuing to progress cutting, agility, and jumping work as appropriate. Thank you for this referral!    Assessment / Recommendations: Pt would benefit from continued therapy to further minimize asymmetry in lower extremity strength and plyometric performance, as well as continuing to progress cutting, agility, and jumping work as appropriate.      Knee AROM: 5/0/140    Running Melody: 164      Single hop test (cm):   R: 124, 132, 138 (131.3 avg)  L: 147, 174, 166 (162.3 avg)  % of unaffected: 81%      Triple hop test (ft, in):           R: 17.1, 17.0, 17.4 (17.2 avg)          L: 19.10, 19.8,  19.6  (19.8 avg)  % of unaffected: 87%     6 m hop test:   R: 1.89 seconds   L: 1.76 seconds   % of unaffected: 93%              Progress towards goals / Updated goals:  Short Term Goals: To be accomplished in 6 weeks:               1. Pt will be independent and compliant with HEP. -MET  2) Pt will demonstrate Knee extension >/= to 0 degrees ext to aid in ambulation -MET  3) Pt will be able to Ambulate with more normalized gait pattern on level terrain - MET  4) Pt will demonstrate Knee flexion >/= to 120 to aid in sitting  -MET  Long Term Goals: To be accomplished in 12 week:               1. Pt will be independent and compliant with HEP. -MET               2. Pt will improve FOTO score by the MCID demonstrating improved overall function with decreased pain or discomfort.                3. Pt will be able to Navigate a flight of stairs without pain - MET  4) Pt will be able to Ambulate on uneven terrain without pain or instability -MET  5) Pt will be able to Ambulate >/= 1 mile without pain -MET  6) Pt will demonstrate Knee flexion >/= 120 degrees to aid in sitting/squatting -MET  7) Pt will be able to return to running >/=1 mile without pain. - MET (2 miles on treadmill)  8) Pt will be able to squat to 90 degrees knee flexion with BW resistance without complaints of pain. - MET  9) Pt will demonstrate >/= 95% of contralateral limb with single and triple SL hop test in order to minimize risk of future injury. - Progressing  10) Pt will demonstrate >/= 95% of unaffected limb with 6 m hop test - Progressing  11) Pt will be able to perform lateral cutting movements without pain or instability. - Progressing  12) Pt will be able to demonstrate rotational control with jumping movements >/= to contralateral limb.  - [facility], PT, DPT 6/21/2021

## 2021-09-08 NOTE — PROGRESS NOTES
Physical Therapy at [facility],   a part of [address] [place] [address]  Phone: [phone]  Fax: [phone]    Discharge Summary  2-15    Patient name: Anabel Marroquin  : 1995  Provider#: [id number]  Referral source: Carolina Wei MD      Medical/Treatment Diagnosis: Knee pain, right [M25.561]     Prior Hospitalization: see medical history     Comorbidities: Prior surgery (left glenohumeral labral tear)  Prior Level of Function: Patient completed 20 minutes of exercise at least 3 times a week. Medications: Verified on Patient Summary List     Start of Care: 21                                                                     Onset Date: 21          Visits from Start of Care: 27     Missed Visits: 0  Reporting Period : 21 to 21      ASSESSMENT/SUMMARY OF CARE: Mr. Jorge Pearson was seen for 27 skilled physical therapy visits S/P R ACL-R and medial meniscectomy 21. Pt had been demonstrating very good progress with his therapy program as he had returned to running, weight-lifting, and performing plyometric activity with good form and without pain. Pt has not been seen since last progress note on 21, despite attempts to reach out to patient and check-in/schedule additional appointments. Per that progress note, Pt was running up to 2 miles without any instability or pain, and had achieved an average of 87% of his unaffected leg with his single leg hop tests. Pt is discharged today, 2021, as they have stopped attending therapy. Final objective data and outcomes were unable to be obtained.  Thank you for this referral!    Knee AROM: 5/0/140    Running Melody: 164      Single hop test (cm):   R: 124, 132, 138 (131.3 avg)  B: 367, 698, 996 (061.5 OND)  % of unaffected: 81%      Triple hop test (ft, in):           R: 17.1, 17.0, 17.4 (17.2 avg)          L: 19.10, 19.8,  19.6  (19.8 avg)  % of unaffected: 87%     6 m hop test:   R: 1.89 seconds   L: 1.76 seconds   % of unaffected: 93%              Progress towards goals / Updated goals:  Short Term Goals: To be accomplished in 6 weeks:               1. Pt will be independent and compliant with HEP. -MET  2) Pt will demonstrate Knee extension >/= to 0 degrees ext to aid in ambulation -MET  3) Pt will be able to Ambulate with more normalized gait pattern on level terrain - MET  4) Pt will demonstrate Knee flexion >/= to 120 to aid in sitting  -MET  Long Term Goals: To be accomplished in 12 week:               1.  Pt will be independent and compliant with HEP. -MET               2. Pt will improve FOTO score by the MCID demonstrating improved overall function with decreased pain or discomfort.                3. Pt will be able to Navigate a flight of stairs without pain - MET  4) Pt will be able to Ambulate on uneven terrain without pain or instability -MET  5) Pt will be able to Ambulate >/= 1 mile without pain -MET  6) Pt will demonstrate Knee flexion >/= 120 degrees to aid in sitting/squatting -MET  7) Pt will be able to return to running >/=1 mile without pain. - MET   8) Pt will be able to squat to 90 degrees knee flexion with BW resistance without complaints of pain. - MET  9) Pt will demonstrate >/= 95% of contralateral limb with single and triple SL hop test in order to minimize risk of future injury. - Not met  10) Pt will demonstrate >/= 95% of unaffected limb with 6 m hop test - Not met  11) Pt will be able to perform lateral cutting movements without pain or instability. - Not met  12) Pt will be able to demonstrate rotational control with jumping movements >/= to contralateral limb. - Not met    RECOMMENDATIONS:  [x]Discontinue therapy: [x]Patient has reached or is progressing toward set goals      [x]Patient is non-compliant or has abdicated      []Due to lack of appreciable progress towards set goals    Kathy Henry, PT, DPT 9/8/2021

## 2025-03-19 NOTE — PROGRESS NOTES
PT DAILY TREATMENT NOTE - [facility] 2-15    Patient Name: Mau Doty  Date:3/19/2021  : 1995  [x]  Patient  Verified  Payor: Darrick / Plan: Blanche Wang / Product Type: HMO /    In time:7:07am  Out time:8:22am  Total Treatment Time (min): 75  Total Timed Codes (min): 65  1:1 Treatment Time ([address] only): 43  Visit #:  16    Treatment Area: Knee pain, right [M25.561]    SUBJECTIVE  Pain Level (0-10 scale): 0/10   Any medication changes, allergies to medications, adverse drug reactions, diagnosis change, or new procedure performed?: [x]????????????? No    []????????????? Yes (see summary sheet for update)  Subjective functional status/changes:   []????????????? No changes reported  Pt reports his knee felt good after last session, but still has pain when he pulls his knee into increased flexion.       OBJECTIVE    Modality rationale: decrease edema, decrease inflammation and decrease pain to improve the patients ability to ambulate with decreased discomfort or pain   Min Type Additional Details       [] Estim: []Att   []Unatt    []TENS instruct                  []IFC  []Premod   []NMES                     []Other:  []w/US   []w/ice   []w/heat  Position:  Location:       []  Traction: [] Cervical       []Lumbar                       [] Prone          []Supine                       []Intermittent   []Continuous Lbs:  [] before manual  [] after manual  []w/heat    []  Ultrasound: []Continuous   [] Pulsed                       at: []1MHz   []3MHz Location:  W/cm2:    [] Paraffin         Location:   []w/heat   15 [x]  Ice     []  Heat  []  Ice massage Position: supine  Location:R knee    []  Laser  []  Other: Position:  Location:      []  Vasopneumatic Device Pressure:       [] lo [] med [] hi   Temperature:      [x] Skin assessment post-treatment:  [x]intact []redness- no adverse reaction    []redness  adverse reaction:       43 min Therapeutic Exercise:  [x]?????????????? See flow sheet: assessment and reassessment   Rationale: increase ROM, increase strength and increase proprioception to improve the patients ability to ambulate with decreased pain and improved mechanics.    12 min Neuromuscular Re-education:  [x]???????  See flow sheet : Rockerboard taps and hold, SLS NC lateral throws with rebounder    Rationale: improve balance and increase proprioception  to improve the patients ability to ambulate with decreased discomfort and improved mechanics.      10 min Manual Therapy: inferior patellar mobs, tibial IR MWM into knee flexion   Rationale: decrease pain and increase ROM to improve the patients ability to bend to end range knee flexion with decreased discomfort or pain.                                                                    With   []????????????? TE   []????????????? TA   []????????????? Neuro   []????????????? SC   []????????????? other: Patient Education: [x]????????????? Review HEP     []????????????? Progressed/Changed HEP based on:   []????????????? positioning   []????????????? body mechanics   []????????????? transfers   [x]????????????? heat/ice application    []????????????? other:       Other Objective/Functional Measures:     AROM 5-0-135 B     Pain Level (0-10 scale) post treatment: 0/10     ASSESSMENT/Changes in Function:   Pt reported that he felt his knee able to bend further without pain after tibial IR mobilizations. Pt able to tolerate forward lunges with R leg posterior with increased stance distance, but still challenged. Pt tolerated side lunges, reporting feeling significant difference in strength compared to noninvolved side. Will continue to load SL squats and DL squats on total gym; pt tolerated 70 pounds on level 26 for DL. Will increased reps next session.    Patient will continue to benefit from skilled PT services to modify and progress therapeutic interventions, address functional mobility deficits, address ROM deficits, address strength deficits, analyze and address soft tissue restrictions, analyze and cue movement patterns, analyze and modify body mechanics/ergonomics, assess and modify postural abnormalities, address imbalance/dizziness and instruct in home and community integration to attain remaining goals. Treatment was performed with direct supervision by Tirso Trimble, PT, DPT.      []?????????????  See Plan of Care  [x]?????????????  See progress note/recertification  []?????????????  See Discharge Summary         Progress towards goals / Updated goals:  Short Term Goals: To be accomplished in 6 weeks:               1. Pt will be independent and compliant with HEP. -MET  2) Pt will demonstrate Knee extension >/= to 0 degrees ext to aid in ambulation -MET  3) Pt will be able to Ambulate with more normalized gait pattern on level terrain - MET  4) Pt will demonstrate Knee flexion >/= to 120 to aid in sitting  -MET  Long Term Goals: To be accomplished in 12 week:               1.  Pt will be independent and compliant with HEP. -MET               2. Pt will improve FOTO score by the MCID demonstrating improved overall function with decreased pain or discomfort.               3. Pt will be able to Navigate a flight of stairs without pain - MET  4) Pt will be able to Ambulate on uneven terrain without pain or instability -MET  5) Pt will be able to Ambulate >/= 1 mile without pain -MET  6) Pt will demonstrate Knee flexion >/= 120 degrees to aid in sitting/squatting -MET  7) Pt will be able to return to running >/=1 mile without pain. - Progressing  8) Pt will be able to squat to 90 degrees knee flexion with BW resistance without complaints of pain. - Progressing (pain during initial concentric portion.)     PLAN   [x]?????????????  Upgrade activities as tolerated     [x]?????????????  Continue plan of care  []?????????????  Update interventions per flow sheet       []?????????????  Discharge due to:_  []?????????????  Other:_        Dina House SPT 3/19/2021 63

## 2025-08-11 ENCOUNTER — HOSPITAL ENCOUNTER (OUTPATIENT)
Facility: HOSPITAL | Age: 30
Setting detail: SPECIMEN
Discharge: HOME | End: 2025-08-14

## 2025-08-11 LAB
BASOPHILS # BLD: 0.02 K/UL (ref 0–0.1)
BASOPHILS NFR BLD: 0.3 % (ref 0–1)
DIFFERENTIAL METHOD BLD: NORMAL
EOSINOPHIL # BLD: 0.04 K/UL (ref 0–0.4)
EOSINOPHIL NFR BLD: 0.5 % (ref 0–7)
ERYTHROCYTE [DISTWIDTH] IN BLOOD BY AUTOMATED COUNT: 11.5 % (ref 11.5–14.5)
HCT VFR BLD AUTO: 42.2 % (ref 36.6–50.3)
HGB BLD-MCNC: 14.1 G/DL (ref 12.1–17)
IMM GRANULOCYTES # BLD AUTO: 0.02 K/UL (ref 0–0.04)
IMM GRANULOCYTES NFR BLD AUTO: 0.3 % (ref 0–0.5)
LYMPHOCYTES # BLD: 1.99 K/UL (ref 0.8–3.5)
LYMPHOCYTES NFR BLD: 26.8 % (ref 12–49)
MCH RBC QN AUTO: 29.8 PG (ref 26–34)
MCHC RBC AUTO-ENTMCNC: 33.4 G/DL (ref 30–36.5)
MCV RBC AUTO: 89.2 FL (ref 80–99)
MONOCYTES # BLD: 0.43 K/UL (ref 0–1)
MONOCYTES NFR BLD: 5.8 % (ref 5–13)
NEUTS SEG # BLD: 4.93 K/UL (ref 1.8–8)
NEUTS SEG NFR BLD: 66.3 % (ref 32–75)
NRBC # BLD: 0 K/UL (ref 0–0.01)
NRBC BLD-RTO: 0 PER 100 WBC
PLATELET # BLD AUTO: 184 K/UL (ref 150–400)
PMV BLD AUTO: 10.4 FL (ref 8.9–12.9)
RBC # BLD AUTO: 4.73 M/UL (ref 4.1–5.7)
WBC # BLD AUTO: 7.4 K/UL (ref 4.1–11.1)

## 2025-08-11 PROCEDURE — 80061 LIPID PANEL: CPT

## 2025-08-11 PROCEDURE — 86706 HEP B SURFACE ANTIBODY: CPT

## 2025-08-11 PROCEDURE — 85025 COMPLETE CBC W/AUTO DIFF WBC: CPT

## 2025-08-11 PROCEDURE — 80053 COMPREHEN METABOLIC PANEL: CPT

## 2025-08-12 LAB
ALBUMIN SERPL-MCNC: 4.7 G/DL (ref 3.5–5.2)
ALBUMIN/GLOB SERPL: 2 (ref 1.1–2.2)
ALP SERPL-CCNC: 72 U/L (ref 40–129)
ALT SERPL-CCNC: 31 U/L (ref 10–50)
ANION GAP SERPL CALC-SCNC: 16 MMOL/L (ref 2–14)
AST SERPL-CCNC: 37 U/L (ref 10–50)
BILIRUB SERPL-MCNC: 0.7 MG/DL (ref 0–1.2)
BUN SERPL-MCNC: 24 MG/DL (ref 6–20)
BUN/CREAT SERPL: 19 (ref 12–20)
CALCIUM SERPL-MCNC: 9.7 MG/DL (ref 8.6–10)
CHLORIDE SERPL-SCNC: 101 MMOL/L (ref 98–107)
CHOLEST SERPL-MCNC: 186 MG/DL (ref 0–200)
CO2 SERPL-SCNC: 23 MMOL/L (ref 20–29)
CREAT SERPL-MCNC: 1.27 MG/DL (ref 0.7–1.2)
GLOBULIN SER CALC-MCNC: 2.4 G/DL (ref 2–4)
GLUCOSE SERPL-MCNC: 80 MG/DL (ref 65–100)
HBV SURFACE AB SERPL IA-ACNC: NONREACTIVE MIU/ML
HDLC SERPL-MCNC: 62 MG/DL (ref 40–60)
HDLC SERPL: 3 (ref 0–5)
LDLC SERPL CALC-MCNC: 113 MG/DL (ref 0–100)
POTASSIUM SERPL-SCNC: 4.1 MMOL/L (ref 3.5–5.1)
PROT SERPL-MCNC: 7 G/DL (ref 6.4–8.3)
SODIUM SERPL-SCNC: 139 MMOL/L (ref 136–145)
TRIGL SERPL-MCNC: 57 MG/DL (ref 0–150)
VLDLC SERPL CALC-MCNC: 11 MG/DL